# Patient Record
Sex: MALE | Race: WHITE
[De-identification: names, ages, dates, MRNs, and addresses within clinical notes are randomized per-mention and may not be internally consistent; named-entity substitution may affect disease eponyms.]

---

## 2017-03-21 ENCOUNTER — HOSPITAL ENCOUNTER (INPATIENT)
Dept: HOSPITAL 45 - C.EDB | Age: 82
LOS: 2 days | Discharge: HOME | DRG: 392 | End: 2017-03-23
Attending: HOSPITALIST | Admitting: HOSPITALIST
Payer: COMMERCIAL

## 2017-03-21 VITALS
WEIGHT: 155.43 LBS | HEIGHT: 67 IN | HEIGHT: 67 IN | BODY MASS INDEX: 24.39 KG/M2 | BODY MASS INDEX: 24.39 KG/M2 | WEIGHT: 155.43 LBS

## 2017-03-21 DIAGNOSIS — Z87.891: ICD-10-CM

## 2017-03-21 DIAGNOSIS — I08.0: ICD-10-CM

## 2017-03-21 DIAGNOSIS — Z95.0: ICD-10-CM

## 2017-03-21 DIAGNOSIS — R10.13: Primary | ICD-10-CM

## 2017-03-21 DIAGNOSIS — Z79.899: ICD-10-CM

## 2017-03-21 DIAGNOSIS — E78.00: ICD-10-CM

## 2017-03-21 DIAGNOSIS — I48.0: ICD-10-CM

## 2017-03-21 DIAGNOSIS — K29.70: ICD-10-CM

## 2017-03-21 DIAGNOSIS — Z95.1: ICD-10-CM

## 2017-03-21 DIAGNOSIS — I25.10: ICD-10-CM

## 2017-03-21 DIAGNOSIS — Z79.01: ICD-10-CM

## 2017-03-21 DIAGNOSIS — K21.9: ICD-10-CM

## 2017-03-21 DIAGNOSIS — I10: ICD-10-CM

## 2017-03-21 DIAGNOSIS — N28.89: ICD-10-CM

## 2017-03-21 LAB
ALP SERPL-CCNC: 77 U/L (ref 45–117)
ALT SERPL-CCNC: 25 U/L (ref 12–78)
ANION GAP SERPL CALC-SCNC: 7 MMOL/L (ref 3–11)
APPEARANCE UR: CLEAR
AST SERPL-CCNC: 20 U/L (ref 15–37)
BASOPHILS # BLD: 0.03 K/UL (ref 0–0.2)
BASOPHILS NFR BLD: 0.5 %
BILIRUB UR-MCNC: (no result) MG/DL
BUN SERPL-MCNC: 15 MG/DL (ref 7–18)
BUN/CREAT SERPL: 11.3 (ref 10–20)
CALCIUM SERPL-MCNC: 9.5 MG/DL (ref 8.5–10.1)
CHLORIDE SERPL-SCNC: 104 MMOL/L (ref 98–107)
CO2 SERPL-SCNC: 30 MMOL/L (ref 21–32)
COLOR UR: YELLOW
COMPLETE: YES
CREAT CL PREDICTED SERPL C-G-VRATE: 38.9 ML/MIN
CREAT SERPL-MCNC: 1.3 MG/DL (ref 0.6–1.4)
EOSINOPHIL NFR BLD AUTO: 162 K/UL (ref 130–400)
GLUCOSE SERPL-MCNC: 88 MG/DL (ref 70–99)
HCT VFR BLD CALC: 42.6 % (ref 42–52)
IG%: 0 %
IMM GRANULOCYTES NFR BLD AUTO: 35.6 %
INR PPP: 1.1 (ref 0.9–1.1)
LYMPHOCYTES # BLD: 2.03 K/UL (ref 1.2–3.4)
MANUAL MICROSCOPIC REQUIRED?: NO
MCH RBC QN AUTO: 30.3 PG (ref 25–34)
MCHC RBC AUTO-ENTMCNC: 35 G/DL (ref 32–36)
MCV RBC AUTO: 86.8 FL (ref 80–100)
MONOCYTES NFR BLD: 7.5 %
NEUTROPHILS # BLD AUTO: 0.7 %
NEUTROPHILS NFR BLD AUTO: 55.7 %
NITRITE UR QL STRIP: (no result)
PH UR STRIP: 7.5 [PH] (ref 4.5–7.5)
PMV BLD AUTO: 9.4 FL (ref 7.4–10.4)
POTASSIUM SERPL-SCNC: 3.9 MMOL/L (ref 3.5–5.1)
PROTHROMBIN TIME: 12.1 SECONDS (ref 9–12)
RBC # BLD AUTO: 4.91 M/UL (ref 4.7–6.1)
REVIEW REQ?: NO
SODIUM SERPL-SCNC: 141 MMOL/L (ref 136–145)
SP GR UR STRIP: 1.03 (ref 1–1.03)
URINE EPITHELIAL CELL AUTO: (no result) /LPF (ref 0–5)
UROBILINOGEN UR-MCNC: (no result) MG/DL
WBC # BLD AUTO: 5.71 K/UL (ref 4.8–10.8)
ZZUR CULT IF INDIC CLEAN CATCH: NO

## 2017-03-21 NOTE — DIAGNOSTIC IMAGING REPORT
CHEST ONE VIEW PORTABLE



HISTORY:      Atypical chest pain.



COMPARISON: Chest 11/30/2012.



FINDINGS: Left-sided dual-chamber pacemaker. The heart remains top normal in

size. Tortuous thoracic aorta, unchanged. No focal lung consolidations to

suggest pneumonia. No evidence for pulmonary edema. No pleural effusions. No

pneumothorax.



IMPRESSION:

No significant change compared to the prior study. No acute process.







Electronically signed by:  Flip Casillas M.D.

3/21/2017 8:23 PM



Dictated Date/Time:  3/21/2017 8:22 PM

## 2017-03-21 NOTE — DIAGNOSTIC IMAGING REPORT
ABDOMEN AND PELVIS CT WITH IV CONTRAST



CT DOSE: 321.19 mGy.cm



HISTORY:      epigastric abdominal pain 



TECHNIQUE: Multiaxial CT images of the abdomen and pelvis were performed

following the use of intravenous contrast.



COMPARISON STUDY: Abdomen and pelvis CT 11/26/2012.



FINDINGS: The lung bases are clear. Mild elevation of the left hemidiaphragm,

unchanged. Pacemaker wires and poststernotomy changes are noted. Stable 5 cm

fat-containing lesion within the left lateral chest wall. This favors a lipoma.

Cholecystectomy. The liver, adrenal glands, spleen, and pancreas are

unremarkable. No hydronephrosis. There is a 2 cm heterogeneous enhancing mass

within the interpolar region of the left kidney. This is consistent with a renal

cell carcinoma. There is a 5.9 cm cyst within the right kidney. There are few

additional bilateral renal hypodense lesions. The largest in the lower pole the

left kidney measures 1 cm and also favors a cyst. Additional subcentimeter

hypodense lesions within the kidneys measure up to 7 mm are technically too

small to characterize. The bladder is unremarkable. The prostate gland remains

enlarged. Colonic diverticulosis. No bowel wall thickening or obstruction. The

appendix is not clearly identified.



IMPRESSION: 



1. No bowel wall thickening or obstruction.

2. Colonic diverticulosis.

3. A 2 cm enhancing mass within the left kidney. This is consistent with a renal

cell carcinoma. Follow-up urologic consultation is recommended on a nonemergent

basis. 







Electronically signed by:  Flip Casillas M.D.

3/21/2017 10:01 PM



Dictated Date/Time:  3/21/2017 9:50 PM

## 2017-03-22 VITALS
DIASTOLIC BLOOD PRESSURE: 100 MMHG | TEMPERATURE: 99.32 F | OXYGEN SATURATION: 96 % | HEART RATE: 72 BPM | SYSTOLIC BLOOD PRESSURE: 166 MMHG

## 2017-03-22 VITALS — DIASTOLIC BLOOD PRESSURE: 86 MMHG | HEART RATE: 62 BPM | SYSTOLIC BLOOD PRESSURE: 189 MMHG

## 2017-03-22 VITALS
TEMPERATURE: 98.6 F | SYSTOLIC BLOOD PRESSURE: 134 MMHG | DIASTOLIC BLOOD PRESSURE: 76 MMHG | HEART RATE: 78 BPM | OXYGEN SATURATION: 94 %

## 2017-03-22 VITALS
TEMPERATURE: 98.78 F | DIASTOLIC BLOOD PRESSURE: 72 MMHG | OXYGEN SATURATION: 93 % | HEART RATE: 69 BPM | SYSTOLIC BLOOD PRESSURE: 132 MMHG

## 2017-03-22 VITALS
TEMPERATURE: 97.88 F | HEART RATE: 63 BPM | SYSTOLIC BLOOD PRESSURE: 157 MMHG | OXYGEN SATURATION: 93 % | DIASTOLIC BLOOD PRESSURE: 81 MMHG

## 2017-03-22 VITALS — SYSTOLIC BLOOD PRESSURE: 95 MMHG | HEART RATE: 65 BPM | DIASTOLIC BLOOD PRESSURE: 70 MMHG

## 2017-03-22 VITALS
DIASTOLIC BLOOD PRESSURE: 64 MMHG | TEMPERATURE: 97.34 F | OXYGEN SATURATION: 94 % | SYSTOLIC BLOOD PRESSURE: 114 MMHG | HEART RATE: 58 BPM

## 2017-03-22 VITALS — SYSTOLIC BLOOD PRESSURE: 183 MMHG | DIASTOLIC BLOOD PRESSURE: 85 MMHG

## 2017-03-22 LAB
CHOLEST/HDLC SERPL: 2.6 {RATIO}
CKMB/CK RATIO: 1.4 (ref 0–3)
CKMB/CK RATIO: 1.6 (ref 0–3)
CKMB/CK RATIO: 1.7 (ref 0–3)
CKMB/CK RATIO: 1.8 (ref 0–3)
GLUCOSE UR QL: 45 MG/DL
KETONES UR QL STRIP: 45 MG/DL
NITRITE UR QL STRIP: 124 MG/DL (ref 0–150)
PH UR: 115 MG/DL (ref 0–200)
VERY LOW DENSITY LIPOPROT CALC: 25 MG/DL

## 2017-03-22 RX ADMIN — HYDROCHLOROTHIAZIDE SCH MG: 25 TABLET ORAL at 08:59

## 2017-03-22 RX ADMIN — SUCRALFATE SCH GM: 1 SUSPENSION ORAL at 17:17

## 2017-03-22 RX ADMIN — SUCRALFATE SCH GM: 1 SUSPENSION ORAL at 19:06

## 2017-03-22 RX ADMIN — APIXABAN SCH MG: 2.5 TABLET, FILM COATED ORAL at 08:59

## 2017-03-22 RX ADMIN — SUCRALFATE SCH GM: 1 SUSPENSION ORAL at 13:45

## 2017-03-22 RX ADMIN — PANTOPRAZOLE SODIUM SCH MLS/MIN: 40 INJECTION, POWDER, FOR SOLUTION INTRAVENOUS at 19:19

## 2017-03-22 RX ADMIN — Medication SCH MG: at 08:58

## 2017-03-22 RX ADMIN — CARVEDILOL SCH MG: 3.12 TABLET, FILM COATED ORAL at 19:08

## 2017-03-22 RX ADMIN — MORPHINE SULFATE PRN MG: 2 INJECTION, SOLUTION INTRAMUSCULAR; INTRAVENOUS at 12:39

## 2017-03-22 RX ADMIN — CARVEDILOL SCH MG: 3.12 TABLET, FILM COATED ORAL at 14:37

## 2017-03-22 RX ADMIN — LISINOPRIL SCH MG: 5 TABLET ORAL at 08:58

## 2017-03-22 RX ADMIN — MORPHINE SULFATE PRN MG: 2 INJECTION, SOLUTION INTRAMUSCULAR; INTRAVENOUS at 09:02

## 2017-03-22 RX ADMIN — APIXABAN SCH MG: 2.5 TABLET, FILM COATED ORAL at 19:06

## 2017-03-22 NOTE — UROLOGY CONSULTATION
History


General


Date of Service:


Mar 22, 2017.


Chief Complaint:  left renal mass


Primary Care Physician:


Anson Cueto M.D.


Pt seen a urologist before?:  No





History of Present Illness


84 yo male admitted for chest and epigastric pain. 


Currently in the ED awaiting bed placement. 


 consulted for incidental finding of 2.5cm enhancing left renal mass on CT 

scan. Interpolar.


The pt denies seeing a urologist in the past, and has no knowledge of having 

been told he has a renal mass in the past. 


Denies dysuria or hematuria. 


Primary concerned with his epigastric/upper abdominal pain which he reports has 

been ongoing for a while now. 


Denies n/v.





Imaging


Imaging:  CT, Ultrasound





Laboratory





Last 24 Hours








Test


  3/21/17


20:10 3/21/17


22:55 3/22/17


05:05


 


White Blood Count 5.71 K/uL   


 


Red Blood Count 4.91 M/uL   


 


Hemoglobin 14.9 g/dL   


 


Hematocrit 42.6 %   


 


Mean Corpuscular Volume 86.8 fL   


 


Mean Corpuscular Hemoglobin 30.3 pg   


 


Mean Corpuscular Hemoglobin


Concent 35.0 g/dl 


  


  


 


 


Platelet Count 162 K/uL   


 


Mean Platelet Volume 9.4 fL   


 


Neutrophils (%) (Auto) 55.7 %   


 


Lymphocytes (%) (Auto) 35.6 %   


 


Monocytes (%) (Auto) 7.5 %   


 


Eosinophils (%) (Auto) 0.7 %   


 


Basophils (%) (Auto) 0.5 %   


 


Neutrophils # (Auto) 3.18 K/uL   


 


Lymphocytes # (Auto) 2.03 K/uL   


 


Monocytes # (Auto) 0.43 K/uL   


 


Eosinophils # (Auto) 0.04 K/uL   


 


Basophils # (Auto) 0.03 K/uL   


 


RDW Standard Deviation 43.6 fL   


 


RDW Coefficient of Variation 13.7 %   


 


Immature Granulocyte % (Auto) 0.0 %   


 


Immature Granulocyte # (Auto) 0.00 K/uL   


 


Prothrombin Time 12.1 SECONDS   


 


Prothromb Time International


Ratio 1.1 


  


  


 


 


Sodium Level 141 mmol/L   


 


Potassium Level 3.9 mmol/L   


 


Chloride Level 104 mmol/L   


 


Carbon Dioxide Level 30 mmol/L   


 


Anion Gap 7.0 mmol/L   


 


Blood Urea Nitrogen 15 mg/dl   


 


Creatinine 1.30 mg/dl   


 


Est Creatinine Clear Calc


Drug Dose 38.9 ml/min 


  


  


 


 


Estimated GFR (


American) 57.7 


  


  


 


 


Estimated GFR (Non-


American 49.8 


  


  


 


 


BUN/Creatinine Ratio 11.3   


 


Random Glucose 88 mg/dl   


 


Calcium Level 9.5 mg/dl   


 


Total Bilirubin 1.1 mg/dl   


 


Direct Bilirubin 0.3 mg/dl   


 


Aspartate Amino Transf


(AST/SGOT) 20 U/L 


  


  


 


 


Alanine Aminotransferase


(ALT/SGPT) 25 U/L 


  


  


 


 


Alkaline Phosphatase 77 U/L   


 


Troponin I < 0.015 ng/ml   < 0.015 ng/ml 


 


Total Protein 7.5 gm/dl   


 


Albumin 4.0 gm/dl   


 


Lipase 180 U/L   


 


Urine Color  YELLOW  


 


Urine Appearance  CLEAR  


 


Urine pH  7.5  


 


Urine Specific Gravity  1.026  


 


Urine Protein  NEG  


 


Urine Glucose (UA)  NEG  


 


Urine Ketones  NEG  


 


Urine Occult Blood  NEG  


 


Urine Nitrite  NEG  


 


Urine Bilirubin  NEG  


 


Urine Urobilinogen  NEG  


 


Urine Leukocyte Esterase  NEG  


 


Urine WBC (Auto)  0 /hpf  


 


Urine RBC (Auto)  0-4 /hpf  


 


Urine Hyaline Casts (Auto)  0 /lpf  


 


Urine Epithelial Cells (Auto)  0-5 /lpf  


 


Urine Bacteria (Auto)  NEG  


 


Total Creatine Kinase   99 U/L 


 


Creatine Kinase MB   1.4 ng/ml 


 


Creatine Kinase MB Ratio   1.4 


 


Triglycerides Level   124 mg/dl 


 


Cholesterol Level   115 mg/dl 


 


HDL Cholesterol   45 mg/dl 


 


LDL Cholesterol, Calculated   45 mg/dl 


 


VLDL Cholesterol, Calculated   25 mg/dl 


 


Cholesterol/HDL Ratio   2.6 











Problem List


Medical Problems:


(1) Abdominal pain


Status: Acute  





(2) Renal carcinoma


Status: Acute  





(3) Substernal precordial chest pain


Status: Acute  











Past History


A Fib, coronary artery disease, GERD


Past Surgical History:  coronary bypass surgery





Family History


Non-contributory





Social History


Hx Tobacco Use In Past Year?:  No


Smoking:  other (former smoker)


Alcohol:  no current use


Drug use:  none


Marital status:  


Housing status:  lives with family


Occupation status:  retired





Immunizations


History of Influenza Vaccine:  Yes


Influenza Vaccine Date:  Oct 28, 2012


History of Tetanus Vaccine?:  Unknown


History of Pneumococcal:  Yes


Pneumococcal Date:  Nov 28, 2010


History of Hepatitis B Vaccine:  No





History of MDRO


No





Allergies


Coded Allergies:  


     Niacin (Verified  Allergy, Unknown, 3/21/17)





Medications


Home Medications:





Home Meds and Scripts








 Medications  Dose


 Route/Sig


 Max Daily Dose Days Date Category


 


 Vitamin C


  (Ascorbic Acid)


 1,000 Mg Tab  1,000 Unit


 PO DAILY


    3/21/17 Reported


 


 Zantac


  (Ranitidine HCl)


 150 Mg Tab  150 Mg


 PO BID PRN


    3/21/17 Reported


 


 Ocean Nasal Spray


  (Saline) 0.65 %


 Spr  


 UD


    3/21/17 Reported


 


 Mag-Ox (Magnesium


 Oxide) 400 Mg Tab  400 Mg


 PO DAILY


    3/21/17 Reported


 


 Glucosamine 1500


 Complex


  (Glucosamine-Chondroitin-Vit


 C-) 1 Cap Cap  1 Cap


 PO DAILY


    3/21/17 Reported


 


 Omega-3 (Fish


 Oil) 1 Ea Cap  1 Cap


 PO DAILY


    3/21/17 Reported


 


 Prevacid


  (Lansoprazole) 15


 Mg Capcr  15 Mg


 PO BID PRN


    3/21/17 Reported


 


 Eliquis


  (Apixaban) 5 Mg


 Tab  5 Mg


 PO BID


    3/21/17 Reported


 


 Econazole Nitrate


 1 % Cre  1 Appln


 TOP BID PRN


    3/21/17 Reported


 


 Vitamin D


  (Cholecalciferol)


 1,000 Unit Tab  1,000 Unit


 PO DAILY


    3/21/17 Reported


 


 Doxazosin


 Mesylate 4 Mg Tab  2 Mg


 PO DAILY


    3/21/17 Reported


 


 Lisinopril 5 Mg


 Tab  5 Mg


 PO DAILY


    3/21/17 Reported


 


 Hydrochlorothiazide


 25 Mg Tab  25 Mg


 PO DAILY


    3/21/17 Reported


 


 Lipitor


  (Atorvastatin) 40


 Mg Tab  40 Mg


 PO HS


    11/28/12 Reported


 


 Multivitamin


  (Multivitamins)


 Tab  1 Tablet


 PO DAILY


    7/8/08 Reported








Inpatient Medications:





 Current Inpatient Medications








 Medications


  (Trade)  Dose


 Ordered  Sig/Brisa


 Route  Start Time


 Stop Time Status Last Admin


Dose Admin


 


 Ioversol


  (Optiray 320)  125 ml  UD  PRN


 IV  3/21/17 20:15


 3/25/17 20:14   


 


 


 Atorvastatin


 Calcium


  (Lipitor Tab)  40 mg  HS


 PO  3/22/17 21:00


 4/21/17 20:59   


 


 


 Doxazosin Mesylate


  (Cardura Tab)  2 mg  HS


 PO  3/22/17 21:00


 4/21/17 20:59   


 


 


 Hydrochlorothiazide


  (Hydrochlorothiazide


 Tab)  25 mg  DAILY


 PO  3/22/17 09:00


 4/21/17 08:59   


 


 


 Lisinopril


  (Zestril Tab)  5 mg  DAILY


 PO  3/22/17 09:00


 4/21/17 08:59   


 


 


 Magnesium Oxide


  (Mag-Ox Tab)  400 mg  DAILY


 PO  3/22/17 09:00


 4/21/17 08:59   


 


 


 Apixaban


  (Eliquis Tab)  5 mg  BID


 PO  3/22/17 09:00


 4/21/17 08:59   


 


 


 Lansoprazole


  (Prevacid


 Solutab)  15 mg  BID  PRN


 PO  3/22/17 00:45


 4/21/17 00:44   


 


 


 Morphine Sulfate


  (MoRPHine


 SULFATE INJ)  2 mg  Q4H  PRN


 IV  3/22/17 01:45


 4/5/17 01:44   


 











Review of Systems


Review of Systems


Constitutional:  No chills, No fever


Eyes:  No double vision


Neurological:  No dizzy


Endocrine:  No excessive thirst


Gastrointestinal:  + abdominal pain (upper abdominal/epigastric), No nausea, No 

vomiting


Cardiovascular:  No chest pain


Respiratory:  No shortness of breath


Skin:  No rash


Musculoskeletal:  + arthritis


Male :  No blood in urine, No painful urination





Physical Exam


Vital Signs:





 Vital Signs Past 12 Hours








  Date Time  Temp Pulse Resp B/P Pulse Ox O2 Delivery O2 Flow Rate FiO2


 


3/22/17 08:06 37.4 73 20 166/100    


 


3/22/17 06:14  72 16 163/92 94 Room Air  


 


3/22/17 05:19  76      


 


3/22/17 05:03  69 16 157/91 95 Room Air  


 


3/22/17 03:39  67 20 140/80 94 Room Air  


 


3/22/17 01:44  66 16 157/84 99 Room Air  


 


3/22/17 01:29  64      


 


3/22/17 00:26  66 18 160/80 93 Room Air  


 


3/21/17 23:00  68 16 179/94 96 Room Air  


 


3/21/17 22:43  72 18 148/84 97 Room Air  


 


3/21/17 22:35  70 20 159/89 95 Room Air  


 


3/21/17 22:20  62 18 197/106 95 Room Air  


 


3/21/17 21:23  73 22 181/99 96 Room Air  


 


3/21/17 20:33  60      








Physical Exam:


General Appearance:  no apparent distress


Eyes:  bilateral eyes normal inspection


ENT:  hearing grossly normal


Neck:  no JVD


Respiratory/Chest:  no respiratory distress, no accessory muscle use


Cardiovascular:  no JVD


Extremities:  normal inspection


Neurologic/Psychiatric:  alert, normal mood/affect, oriented x 3


Skin:  normal color





Assessment & Plan


Assessment & Plan


84 yo pleasant male with incidental 2.5cm enhancing left renal lesion found on 

CT. 


I have recommended outpatient f/u in 2-3 weeks with either Dr. Kurtz or Dr. Carlson to discuss observation vs intervention. Not currently an urgent issue at 

this time, as his epigastic pain is his primary issue at this time. 


Thanks for the consult. Will continue to follow along with primary service. 


Agree with above . Have pt f/u as outpt with Dr. Carlson or Jerardo

## 2017-03-22 NOTE — DIAGNOSTIC IMAGING REPORT
ULTRASOUND KIDNEYS AND BLADDER



CLINICAL HISTORY: Left renal lesion.



COMPARISON STUDY: Abdominal CT dated 3/21/2017.



TECHNIQUE: Real-time, grayscale, and color flow sonography of the kidneys and

bladder is performed. Images are reviewed in the transverse and longitudinal

planes.



FINDINGS:



Kidneys: The kidneys are atrophic. The right kidney measures 10.8 x 5.0 x 6.3 cm

and the left kidney measures 11.0 x 6.1 x 7.7 cm.  There is no hydronephrosis.

No shadowing renal calculi are identified. A 5.7 cm cyst is noted arising from

the right kidney. There is an indeterminant 2.5 cm solid-appearing lesion in the

interpolar left kidney. This shows internal flow on color imaging. A 2.2 cm cyst

is noted in the left lower pole. No perinephric fluid is identified.



Bladder: The partially decompressed bladder is grossly normal in appearance.

Ureteral jets were not seen.





IMPRESSION: 



1. The kidneys are atrophic and without hydronephrosis.



2. There is a 2.5 cm solid-appearing lesion in the interpolar left kidney

worrisome for neoplasm. This was better characterized on the contrast-enhanced

CT and nonemergent urology consultation is again recommended.



3. The bladder was decompressed and grossly unremarkable.







Electronically signed by:  Regan Plunkett M.D.

3/22/2017 7:15 AM



Dictated Date/Time:  3/22/2017 7:13 AM

## 2017-03-22 NOTE — HOSPITALIST PROGRESS NOTE
Hospitalist Progress Note


Date of Service


Mar 22, 2017.


 (Angelica Jain PA-C)





Subjective


Pt evaluation today including:  conversation w/ patient, physical exam, lab 

review


Patient reports continued epigastric abdominal pain.  Denies any nausea.  No 

changes in bowel movements.  No fever or chills.  Denies diaphoresis.  No 

dizziness.





   Additional Comments:


6 system review negative. Please see pertinent positives in the history of 

present illness section.


 (Angelica Jain PA-C)





Objective


Vital Signs











  Date Time  Temp Pulse Resp B/P Pulse Ox O2 Delivery O2 Flow Rate FiO2


 


3/22/17 11:45 36.6 63 18 157/81 93   


 


3/22/17 08:20 37.4 72 20 166/100 96 Room Air  


 


3/22/17 08:06 37.4 73 20 166/100    


 


3/22/17 06:14  72 16 163/92 94 Room Air  


 


3/22/17 05:19  76      


 


3/22/17 05:03  69 16 157/91 95 Room Air  


 


3/22/17 03:39  67 20 140/80 94 Room Air  


 


3/22/17 01:44  66 16 157/84 99 Room Air  


 


3/22/17 01:29  64      


 


3/22/17 00:26  66 18 160/80 93 Room Air  


 


3/21/17 23:00  68 16 179/94 96 Room Air  


 


3/21/17 22:43  72 18 148/84 97 Room Air  


 


3/21/17 22:35  70 20 159/89 95 Room Air  


 


3/21/17 22:20  62 18 197/106 95 Room Air  


 


3/21/17 21:23  73 22 181/99 96 Room Air  


 


3/21/17 20:33  60      


 


3/21/17 20:10  61 18 209/100 98 Room Air  


 


3/21/17 18:40 36.7 64 18 210/107 98 Room Air  








 (Angelica Jain PA-C)





Physical Exam


General Appearance:  no apparent distress


Eyes:  EOMI


Neck:  no JVD


Respiratory/Chest:  lungs clear


Cardiovascular:  regular rate, rhythm, + systolic murmur


Abdomen:  soft, + pertinent finding (tenderness to palpation in the epigastric 

region without any guarding or rebound tenderness appreciated.)


Extremities:  non-tender, no pedal edema


Neurologic/Psychiatric:  no motor/sensory deficits, oriented x 3


Skin:  warm/dry


 (Angelica Jain PA-C)





Laboratory Results


3/21/17 20:10








Red Blood Count 4.91, Mean Corpuscular Volume 86.8, Mean Corpuscular Hemoglobin 

30.3, Mean Corpuscular Hemoglobin Concent 35.0, Mean Platelet Volume 9.4, 

Neutrophils (%) (Auto) 55.7, Lymphocytes (%) (Auto) 35.6, Monocytes (%) (Auto) 

7.5, Eosinophils (%) (Auto) 0.7, Basophils (%) (Auto) 0.5, Neutrophils # (Auto) 

3.18, Lymphocytes # (Auto) 2.03, Monocytes # (Auto) 0.43, Eosinophils # (Auto) 

0.04, Basophils # (Auto) 0.03





3/21/17 20:10

















Test


  3/21/17


20:10 3/21/17


22:55 3/22/17


05:05 3/22/17


11:15


 


White Blood Count


  5.71 K/uL


(4.8-10.8) 


  


  


 


 


Red Blood Count


  4.91 M/uL


(4.7-6.1) 


  


  


 


 


Hemoglobin


  14.9 g/dL


(14.0-18.0) 


  


  


 


 


Hematocrit 42.6 % (42-52)    


 


Mean Corpuscular Volume


  86.8 fL


() 


  


  


 


 


Mean Corpuscular Hemoglobin


  30.3 pg


(25-34) 


  


  


 


 


Mean Corpuscular Hemoglobin


Concent 35.0 g/dl


(32-36) 


  


  


 


 


Platelet Count


  162 K/uL


(130-400) 


  


  


 


 


Mean Platelet Volume


  9.4 fL


(7.4-10.4) 


  


  


 


 


Neutrophils (%) (Auto) 55.7 %    


 


Lymphocytes (%) (Auto) 35.6 %    


 


Monocytes (%) (Auto) 7.5 %    


 


Eosinophils (%) (Auto) 0.7 %    


 


Basophils (%) (Auto) 0.5 %    


 


Neutrophils # (Auto)


  3.18 K/uL


(1.4-6.5) 


  


  


 


 


Lymphocytes # (Auto)


  2.03 K/uL


(1.2-3.4) 


  


  


 


 


Monocytes # (Auto)


  0.43 K/uL


(0.11-0.59) 


  


  


 


 


Eosinophils # (Auto)


  0.04 K/uL


(0-0.5) 


  


  


 


 


Basophils # (Auto)


  0.03 K/uL


(0-0.2) 


  


  


 


 


RDW Standard Deviation


  43.6 fL


(36.4-46.3) 


  


  


 


 


RDW Coefficient of Variation


  13.7 %


(11.5-14.5) 


  


  


 


 


Immature Granulocyte % (Auto) 0.0 %    


 


Immature Granulocyte # (Auto)


  0.00 K/uL


(0.00-0.02) 


  


  


 


 


Prothrombin Time


  12.1 SECONDS


(9.0-12.0) 


  


  


 


 


Prothromb Time International


Ratio 1.1 (0.9-1.1) 


  


  


  


 


 


Anion Gap


  7.0 mmol/L


(3-11) 


  


  


 


 


Est Creatinine Clear Calc


Drug Dose 38.9 ml/min 


  


  


  


 


 


Estimated GFR (


American) 57.7 


  


  


  


 


 


Estimated GFR (Non-


American 49.8 


  


  


  


 


 


BUN/Creatinine Ratio 11.3 (10-20)    


 


Calcium Level


  9.5 mg/dl


(8.5-10.1) 


  


  


 


 


Total Bilirubin


  1.1 mg/dl


(0.2-1) 


  


  


 


 


Direct Bilirubin


  0.3 mg/dl


(0-0.2) 


  


  


 


 


Aspartate Amino Transf


(AST/SGOT) 20 U/L (15-37) 


  


  


  


 


 


Alanine Aminotransferase


(ALT/SGPT) 25 U/L (12-78) 


  


  


  


 


 


Alkaline Phosphatase


  77 U/L


() 


  


  


 


 


Total Protein


  7.5 gm/dl


(6.4-8.2) 


  


  


 


 


Albumin


  4.0 gm/dl


(3.4-5.0) 


  


  


 


 


Lipase


  180 U/L


() 


  


  


 


 


Urine Color  YELLOW   


 


Urine Appearance  CLEAR (CLEAR)   


 


Urine pH  7.5 (4.5-7.5)   


 


Urine Specific Gravity


  


  1.026


(1.000-1.030) 


  


 


 


Urine Protein  NEG (NEG)   


 


Urine Glucose (UA)  NEG (NEG)   


 


Urine Ketones  NEG (NEG)   


 


Urine Occult Blood  NEG (NEG)   


 


Urine Nitrite  NEG (NEG)   


 


Urine Bilirubin  NEG (NEG)   


 


Urine Urobilinogen  NEG (NEG)   


 


Urine Leukocyte Esterase  NEG (NEG)   


 


Urine WBC (Auto)  0 /hpf (0-5)   


 


Urine RBC (Auto)  0-4 /hpf (0-4)   


 


Urine Hyaline Casts (Auto)  0 /lpf (0-5)   


 


Urine Epithelial Cells (Auto)  0-5 /lpf (0-5)   


 


Urine Bacteria (Auto)  NEG (NEG)   


 


Triglycerides Level


  


  


  124 mg/dl


(0-150) 


 


 


Cholesterol Level


  


  


  115 mg/dl


(0-200) 


 


 


HDL Cholesterol   45 mg/dl  


 


LDL Cholesterol, Calculated   45 mg/dl  


 


VLDL Cholesterol, Calculated   25 mg/dl  


 


Cholesterol/HDL Ratio   2.6  


 


Total Creatine Kinase


  


  


  


  88 U/L


()


 


Creatine Kinase MB


  


  


  


  1.4 ng/ml


(0.5-3.6)


 


Creatine Kinase MB Ratio    1.6 (0-3.0) 


 


Troponin I


  


  


  


  0.016 ng/ml


(0-0.045)








Last 24 Hours








Test


  3/21/17


20:10 3/21/17


22:55 3/22/17


05:05 3/22/17


11:15


 


White Blood Count 5.71 K/uL    


 


Red Blood Count 4.91 M/uL    


 


Hemoglobin 14.9 g/dL    


 


Hematocrit 42.6 %    


 


Mean Corpuscular Volume 86.8 fL    


 


Mean Corpuscular Hemoglobin 30.3 pg    


 


Mean Corpuscular Hemoglobin


Concent 35.0 g/dl 


  


  


  


 


 


Platelet Count 162 K/uL    


 


Mean Platelet Volume 9.4 fL    


 


Neutrophils (%) (Auto) 55.7 %    


 


Lymphocytes (%) (Auto) 35.6 %    


 


Monocytes (%) (Auto) 7.5 %    


 


Eosinophils (%) (Auto) 0.7 %    


 


Basophils (%) (Auto) 0.5 %    


 


Neutrophils # (Auto) 3.18 K/uL    


 


Lymphocytes # (Auto) 2.03 K/uL    


 


Monocytes # (Auto) 0.43 K/uL    


 


Eosinophils # (Auto) 0.04 K/uL    


 


Basophils # (Auto) 0.03 K/uL    


 


RDW Standard Deviation 43.6 fL    


 


RDW Coefficient of Variation 13.7 %    


 


Immature Granulocyte % (Auto) 0.0 %    


 


Immature Granulocyte # (Auto) 0.00 K/uL    


 


Prothrombin Time 12.1 SECONDS    


 


Prothromb Time International


Ratio 1.1 


  


  


  


 


 


Sodium Level 141 mmol/L    


 


Potassium Level 3.9 mmol/L    


 


Chloride Level 104 mmol/L    


 


Carbon Dioxide Level 30 mmol/L    


 


Anion Gap 7.0 mmol/L    


 


Blood Urea Nitrogen 15 mg/dl    


 


Creatinine 1.30 mg/dl    


 


Est Creatinine Clear Calc


Drug Dose 38.9 ml/min 


  


  


  


 


 


Estimated GFR (


American) 57.7 


  


  


  


 


 


Estimated GFR (Non-


American 49.8 


  


  


  


 


 


BUN/Creatinine Ratio 11.3    


 


Random Glucose 88 mg/dl    


 


Calcium Level 9.5 mg/dl    


 


Total Bilirubin 1.1 mg/dl    


 


Direct Bilirubin 0.3 mg/dl    


 


Aspartate Amino Transf


(AST/SGOT) 20 U/L 


  


  


  


 


 


Alanine Aminotransferase


(ALT/SGPT) 25 U/L 


  


  


  


 


 


Alkaline Phosphatase 77 U/L    


 


Troponin I < 0.015 ng/ml   < 0.015 ng/ml  0.016 ng/ml 


 


Total Protein 7.5 gm/dl    


 


Albumin 4.0 gm/dl    


 


Lipase 180 U/L    


 


Urine Color  YELLOW   


 


Urine Appearance  CLEAR   


 


Urine pH  7.5   


 


Urine Specific Gravity  1.026   


 


Urine Protein  NEG   


 


Urine Glucose (UA)  NEG   


 


Urine Ketones  NEG   


 


Urine Occult Blood  NEG   


 


Urine Nitrite  NEG   


 


Urine Bilirubin  NEG   


 


Urine Urobilinogen  NEG   


 


Urine Leukocyte Esterase  NEG   


 


Urine WBC (Auto)  0 /hpf   


 


Urine RBC (Auto)  0-4 /hpf   


 


Urine Hyaline Casts (Auto)  0 /lpf   


 


Urine Epithelial Cells (Auto)  0-5 /lpf   


 


Urine Bacteria (Auto)  NEG   


 


Total Creatine Kinase   99 U/L  88 U/L 


 


Creatine Kinase MB   1.4 ng/ml  1.4 ng/ml 


 


Creatine Kinase MB Ratio   1.4  1.6 


 


Triglycerides Level   124 mg/dl  


 


Cholesterol Level   115 mg/dl  


 


HDL Cholesterol   45 mg/dl  


 


LDL Cholesterol, Calculated   45 mg/dl  


 


VLDL Cholesterol, Calculated   25 mg/dl  


 


Cholesterol/HDL Ratio   2.6  








 (Angelica Jain PA-C)





Assessment and Plan


85-year-old male presents to the emergency department complaining of lower chest

, upper abdominal/epigastric pain that started fairly abruptly yesterday.  

Reportedly some relief with nitroglycerin in the emergency department.





Epigastric/chest pain-sounds GI related


-2 sets of cardiac enzymes negative


-EKG without ischemic changes


-Cardiology consult appreciated-continue current medications.  No further 

recommendations or diagnostic testing needed


-Begin pantoprazole 40 mg IV BID as the patient does have a history of GERD and 

has been off of his antacids for approximately 1 year





Coronary artery disease status post CABG 4 in 1995


-Continue medical management with lisinopril 5 mg daily, Lipitor 40 mg daily


-?  Why the patient isn't on a beta blocker.  His heart rate has been on the 

low side, which may explain why.  We'll defer to cardiology





History of paroxysmal atrial fibrillation-currently in sinus rhythm


-Continue anticoagulation with Eliquis 5 mg BID





New left renal mass


-Seen by urology-recommend outpatient follow-up in 2-3 weeks





DVT prophylaxis


-Eliquis


-TEDS, SCDs





CODE STATUS


-LEVEL I FULL CODE


 (Angelica Jain PA-C)


PA Physician Supervision Note:


I interviewed and examined the patient. Discussed with Angelica Jain PAC and 

agree with findings and plan as documented in the note. Any exceptions or 

clarifications are listed here: None





Pt here with epigastric pain, started at rest, no relieved with gi cocktail, 

neg Cardiac workup, neg CT abdomen pelvis





vss labs stable





abd with reproducible epigastric pain





will consult GI medicine, add carafate and ppi


renal mass is concerning, will have urology follow up


Dr knowles does not feel is acs





Documented By:  Marlon Hinson


 (Marlon Hinson M.D.)

## 2017-03-22 NOTE — CARDIOLOGY CONSULTATION
Cardiology Consultation


Date of Consultation:


Mar 22, 2017.


Requesting Physician:


Dr. Dalton


Attending Physician:


Dr. Villeda


Reason for Consultation:


Epigastric pain


Pt evaluation today including:  conversation w/ patient, physical exam, chart 

review, lab review, review of studies, review of inpatient medication list, 

conversation w/ attending


History of Present Illness


Mr. Bullock is an 85-year-old male with a history of coronary artery disease 

status post CABG x4 in 1995, paroxysmal atrial fibrillation, carotid artery 

disease status post right carotid endarterectomy, sick sinus syndrome status 

post dual-chamber pacemaker implantation, hypertension, mild to moderate aortic 

regurgitation, and mitral regurgitation who presented to the ED yesterday with 

complaints of epigastric discomfort.





He reports that he was sitting in his car around 1:30 pm yesterday waiting for 

his wife who was shopping. He then developed sharp/stabbing pain in his 

epigastric region, which he has never had before. He denied any associated pain 

in his chest, nausea, vomiting, diaphoresis, or shortness of breath. He and his 

wife then ate at the [employer], and his symptoms continued. He went home and 

took Tums and lied down. His pain seemed to worsen with lying down. He then 

proceeded to the ED for further evaluation. His blood pressure was elevated at 

210/107 upon arrival. He was treated with Zofran, GI cocktail, and IV fluids. 

He was also given sublingual nitro. His pain mildly improved after the nitro, 

but continued. His blood pressure improved after nitro administration. He has 

subsequently been treated with IV morphine for pain. This morning, he reports 

that his pain continued throughout the night, and he currently notes the 

discomfort. He feels as though it is worse when he takes deep breaths. He 

denies orthopnea, edema, lightheadedness, syncope, palpitations, abnormal 

bleeding, or cerebrovascular symptoms.





Of note, he reports that his symptoms prior to his CABG surgery included right 

arm and chest pain with associated nausea and diaphoresis.





Review of Systems: As noted in HPI. All other 10 point ROS otherwise negative.





Family History


Noncontributory given his age and documented CAD in himself.





Social History


Smoking Status:  Former Smoker


History of Alcohol Use:  Yes (1 BEER A MONTH)





Review of Systems


Constitutional:  Alert, oriented, in no acute distress


HEENT: Head is atraumatic and normocephalic. EOMs intact. Sclera anicteric. 

Face is symmetric. No perioral cyanosis. Mucous membranes moist.


Neck:  Supple, no JVD, right carotid endarterectomy scar, no carotid bruits


Pulmonary:  Normal respiratory effort, clear to auscultation bilaterally


Cardiac:  Regular rate and rhythm, normal S1 and S2, no gallops, no rubs, 2/6 

systolic murmur heard at the right upper sternal border and left sternal border


Extremities:  No clubbing, cyanosis, or edema. Pulses 2+ and symmetric.  Right 

radial artery access site well healed healed without bleeding or hematoma


Abdomen:  Normal bowel sounds, soft, tender upon palpation throughout but 

acutely tender in his epigastric region


Skin:  Normal skin color, turgor, and pigmentation, no rash, no skin lesions 


Neurological:  Oriented to person, place, and time





Allergies


Coded Allergies:  


     Niacin (Verified  Allergy, Unknown, 3/21/17)





Medications





 Current Inpatient Medications








 Medications


  (Trade)  Dose


 Ordered  Sig/Brisa


 Route  Start Time


 Stop Time Status Last Admin


Dose Admin


 


 Ioversol


  (Optiray 320)  125 ml  UD  PRN


 IV  3/21/17 20:15


 3/25/17 20:14   


 


 


 Atorvastatin


 Calcium


  (Lipitor Tab)  40 mg  HS


 PO  3/22/17 21:00


 4/21/17 20:59   


 


 


 Doxazosin Mesylate


  (Cardura Tab)  2 mg  HS


 PO  3/22/17 21:00


 4/21/17 20:59   


 


 


 Hydrochlorothiazide


  (Hydrochlorothiazide


 Tab)  25 mg  DAILY


 PO  3/22/17 09:00


 4/21/17 08:59  3/22/17 08:59


25 MG


 


 Lisinopril


  (Zestril Tab)  5 mg  DAILY


 PO  3/22/17 09:00


 4/21/17 08:59  3/22/17 08:58


5 MG


 


 Magnesium Oxide


  (Mag-Ox Tab)  400 mg  DAILY


 PO  3/22/17 09:00


 4/21/17 08:59  3/22/17 08:58


400 MG


 


 Apixaban


  (Eliquis Tab)  5 mg  BID


 PO  3/22/17 09:00


 4/21/17 08:59  3/22/17 08:59


5 MG


 


 Lansoprazole


  (Prevacid


 Solutab)  15 mg  BID  PRN


 PO  3/22/17 00:45


 4/21/17 00:44   


 


 


 Morphine Sulfate


  (MoRPHine


 SULFATE INJ)  2 mg  Q4H  PRN


 IV  3/22/17 01:45


 4/5/17 01:44  3/22/17 09:02


2 MG











Physical Exam





 Vital Signs Past 12 Hours








  Date Time  Temp Pulse Resp B/P Pulse Ox O2 Delivery O2 Flow Rate FiO2


 


3/22/17 08:20 37.4 72 20 166/100 96 Room Air  


 


3/22/17 08:06 37.4 73 20 166/100    


 


3/22/17 06:14  72 16 163/92 94 Room Air  


 


3/22/17 05:19  76      


 


3/22/17 05:03  69 16 157/91 95 Room Air  


 


3/22/17 03:39  67 20 140/80 94 Room Air  


 


3/22/17 01:44  66 16 157/84 99 Room Air  


 


3/22/17 01:29  64      


 


3/22/17 00:26  66 18 160/80 93 Room Air  


 


3/21/17 23:00  68 16 179/94 96 Room Air  


 


3/21/17 22:43  72 18 148/84 97 Room Air  


 


3/21/17 22:35  70 20 159/89 95 Room Air  


 


3/21/17 22:20  62 18 197/106 95 Room Air  











Data


Laboratory Results:





Last 24 Hours








Test


  3/21/17


20:10 3/21/17


22:55 3/22/17


05:05


 


White Blood Count 5.71 K/uL   


 


Red Blood Count 4.91 M/uL   


 


Hemoglobin 14.9 g/dL   


 


Hematocrit 42.6 %   


 


Mean Corpuscular Volume 86.8 fL   


 


Mean Corpuscular Hemoglobin 30.3 pg   


 


Mean Corpuscular Hemoglobin


Concent 35.0 g/dl 


  


  


 


 


Platelet Count 162 K/uL   


 


Mean Platelet Volume 9.4 fL   


 


Neutrophils (%) (Auto) 55.7 %   


 


Lymphocytes (%) (Auto) 35.6 %   


 


Monocytes (%) (Auto) 7.5 %   


 


Eosinophils (%) (Auto) 0.7 %   


 


Basophils (%) (Auto) 0.5 %   


 


Neutrophils # (Auto) 3.18 K/uL   


 


Lymphocytes # (Auto) 2.03 K/uL   


 


Monocytes # (Auto) 0.43 K/uL   


 


Eosinophils # (Auto) 0.04 K/uL   


 


Basophils # (Auto) 0.03 K/uL   


 


RDW Standard Deviation 43.6 fL   


 


RDW Coefficient of Variation 13.7 %   


 


Immature Granulocyte % (Auto) 0.0 %   


 


Immature Granulocyte # (Auto) 0.00 K/uL   


 


Prothrombin Time 12.1 SECONDS   


 


Prothromb Time International


Ratio 1.1 


  


  


 


 


Sodium Level 141 mmol/L   


 


Potassium Level 3.9 mmol/L   


 


Chloride Level 104 mmol/L   


 


Carbon Dioxide Level 30 mmol/L   


 


Anion Gap 7.0 mmol/L   


 


Blood Urea Nitrogen 15 mg/dl   


 


Creatinine 1.30 mg/dl   


 


Est Creatinine Clear Calc


Drug Dose 38.9 ml/min 


  


  


 


 


Estimated GFR (


American) 57.7 


  


  


 


 


Estimated GFR (Non-


American 49.8 


  


  


 


 


BUN/Creatinine Ratio 11.3   


 


Random Glucose 88 mg/dl   


 


Calcium Level 9.5 mg/dl   


 


Total Bilirubin 1.1 mg/dl   


 


Direct Bilirubin 0.3 mg/dl   


 


Aspartate Amino Transf


(AST/SGOT) 20 U/L 


  


  


 


 


Alanine Aminotransferase


(ALT/SGPT) 25 U/L 


  


  


 


 


Alkaline Phosphatase 77 U/L   


 


Troponin I < 0.015 ng/ml   < 0.015 ng/ml 


 


Total Protein 7.5 gm/dl   


 


Albumin 4.0 gm/dl   


 


Lipase 180 U/L   


 


Urine Color  YELLOW  


 


Urine Appearance  CLEAR  


 


Urine pH  7.5  


 


Urine Specific Gravity  1.026  


 


Urine Protein  NEG  


 


Urine Glucose (UA)  NEG  


 


Urine Ketones  NEG  


 


Urine Occult Blood  NEG  


 


Urine Nitrite  NEG  


 


Urine Bilirubin  NEG  


 


Urine Urobilinogen  NEG  


 


Urine Leukocyte Esterase  NEG  


 


Urine WBC (Auto)  0 /hpf  


 


Urine RBC (Auto)  0-4 /hpf  


 


Urine Hyaline Casts (Auto)  0 /lpf  


 


Urine Epithelial Cells (Auto)  0-5 /lpf  


 


Urine Bacteria (Auto)  NEG  


 


Total Creatine Kinase   99 U/L 


 


Creatine Kinase MB   1.4 ng/ml 


 


Creatine Kinase MB Ratio   1.4 


 


Triglycerides Level   124 mg/dl 


 


Cholesterol Level   115 mg/dl 


 


HDL Cholesterol   45 mg/dl 


 


LDL Cholesterol, Calculated   45 mg/dl 


 


VLDL Cholesterol, Calculated   25 mg/dl 


 


Cholesterol/HDL Ratio   2.6 








CXR: No significant change compared to the prior study. No acute process.





EKG: Normal sinus rhythm at 66 bpm. Voltage criteria for LVH. No acute ST, T 

wave abnormality.





Assessment & Plan


Patient is an 85-year-old male with known CAD s/p CABG x4, carotid artery 

disease, paroxysmal atrial fibrillation, hx of pacemaker implantation, 

hypertension, and valvular heart disease who presented to the ED yesterday with 

complaints of epigastric discomfort. He reports that he has never had similar 

pain to this before. He notes that his prior anginal symptoms included right 

arm and chest pain. ECG upon arrival showed sinus rhythm with no ischemic 

changes. 2 sets of cardiac enzymes were negative. The patient's discomfort has 

continued overnight, and he is acutely tender upon palpation of his abdomen on 

examination today. Given this information, his discomfort does not appear to be 

cardiac in origin. It could potentially be gastrointestinal in nature, and 

further evaluation of other, non-cardiac, causes is recommended. No additional 

cardiovascular studies or intervention is recommended at this time. Continue 

outpatient cardiac medications as prescribed.





Thank you for allowing  us to see this patient in consultation.





Patient was discussed with Dr. Villeda, who will also be in to see the patient 

later today.








DR. VILLEDA ADDENDUM:


Patient seen and examined.  I agree with assessment and plan as outlined by PUJA Blevins. 





Low suspicion current pain represents ACS.  No recent symptoms to suggest 

progression of his stable ischemic heart disease.  





No need for additional cardiac testing  at this time.  





Blood pressure down after hydralazine.  Continue home regimen and carvedilol.  





OK to hold anticoagulation if further invasive testing needed.

## 2017-03-22 NOTE — EMERGENCY ROOM VISIT NOTE
History


Report prepared by Ricaibfederico:  Harley Samayoa


Under the Supervision of:  Dr. Ranulfo Joseph D.O.


First contact with patient:  19:39


Chief Complaint:  CHEST PAIN


Stated Complaint:  CHEST PAIN


Nursing Triage Summary:  


Pt having non-radiating epigastric pain starting approximately 3 hours ago. Pt 


rates the pain a 8 (0-10 scale). Pt denies any SOB/ diaphoresis. 











PMH: CABG (1995)





History of Present Illness


The patient is a 85 year old male who presents to the Emergency Room with 

complaints of constant upper abdominal pain beginning 5 hours ago. He has a 

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 He has had a pacemaker 

in place for three years. The patient is on Eliquis for A-fib. He has a history 

of GERD and was previously on Prilosec and Zantac. He notes that he recently 

stopped taking the medications for GERD three weeks ago. The patient states 

that the pain in his pain feels similar to his GERD, except it is located 

higher on his abdomen. He states that he has taken Tums for his pain, but 

nothing has improved his symptoms. He states that his pain is worsened with 

laying down and after eating today. Pt denies headache, change in vision, fevers

, pain radiation, shortness of breath, nausea, vomiting, diarrhea, pain with 

urination, and melena.  No exertional component.  No arm or jaw pain.





   Source of History:  patient


   Onset:  5 hours ago


   Position:  abdomen (upper)


   Timing:  constant


   Modifying Factors (Worsening):  eating, other (laying down)


   Modifying Factors (Relieving):  other (none)


   Associated Symptoms:  No SOB, No diarrhea, No fevers, No headache, No melena

, No nausea, No urinary symptoms, No vomiting


Note:


The patient denies any pain radiation.





Review of Systems


See HPI for pertinent positives & negatives. A total of 10 systems reviewed and 

were otherwise negative.





Past Medical & Surgical


Medical Problems:


(1) A-fib


(2) Chest pain


(3) GERD (gastroesophageal reflux disease)


Surgical Problems:


(1) History of quadruple bypass








Family History


No pertinent family history stated.





Social History


Smoking Status:  Former Smoker


Alcohol Use:  none


Drug Use:  none


Marital Status:  


Housing Status:  lives with family


Occupation Status:  retired





Current/Historical Medications


Scheduled


Apixaban (Eliquis), 5 MG PO BID


Ascorbic Acid (Vitamin C), 1,000 UNIT PO DAILY


Atorvastatin (Lipitor), 40 MG PO HS


Cholecalciferol (Vitamin D), 1,000 UNIT PO DAILY


Doxazosin Mesylate (Doxazosin Mesylate), 2 MG PO DAILY


Fish Oil (Omega-3), 1 CAP PO DAILY


Glucosamine-Chondroitin-Vit C- (Glucosamine 1500 Complex), 1 CAP PO DAILY


Hydrochlorothiazide (Hydrochlorothiazide), 25 MG PO DAILY


Lisinopril (Lisinopril), 5 MG PO DAILY


Magnesium Oxide (Mag-Ox), 400 MG PO DAILY


Multivitamin (Multivitamin), 1 TABLET PO DAILY


Saline ([place] Nasal [place]), UD





Scheduled PRN


Econazole Nitrate (Econazole Nitrate), 1 APPLN TOP BID PRN for PRN


Lansoprazole (Prevacid), 15 MG PO BID PRN for PRN


Ranitidine (Zantac), 150 MG PO BID PRN for PRN





Allergies


Coded Allergies:  


     Niacin (Verified  Allergy, Unknown, 3/21/17)





Physical Exam


Vital Signs











  Date Time  Temp Pulse Resp B/P Pulse Ox O2 Delivery O2 Flow Rate FiO2


 


3/21/17 23:00  68 16 179/94 96 Room Air  


 


3/21/17 22:43  72 18 148/84 97 Room Air  


 


3/21/17 22:35  70 20 159/89 95 Room Air  


 


3/21/17 22:20  62 18 197/106 95 Room Air  


 


3/21/17 21:23  73 22 181/99 96 Room Air  


 


3/21/17 20:33  60      


 


3/21/17 20:10  61 18 209/100 98 Room Air  


 


3/21/17 18:40 36.7 64 18 210/107 98 Room Air  











Physical Exam


GENERAL: Sitting up in bed, disheveled, no acute distress, nontoxic.


EYE EXAM: normal conjunctiva.


OROPHARYNX: no exudate, no erythema, lips, buccal mucosa, and tongue normal and 

mucous membranes are moist


NECK: supple, no nuchal rigidity, no adenopathy, non-tender


LUNGS: Clear to auscultation. Normal chest wall mechanics


HEART: no murmurs, S1 normal and S2 normal 


ABDOMEN: abdomen soft with tenderness to palpation of the epigastric. Normo-

active bowel sounds, no masses, no rebound or guarding. 


BACK: Back is symmetrical on inspection and there is no deformity, no midline 

tenderness, no CVA tenderness. 


SKIN: no rashes and no bruising 


UPPER EXTREMITIES: upper extremities are grossly normal. 


LOWER EXTREMITIES: No pitting edema. Calves are equal bilaterally. 


NEURO EXAM: Normal sensorium, cranial nerves II-XII grossly intact, normal 

speech,  no gross weakness of arms, no gross weakness of legs.





Medical Decision & Procedures


ER Provider


Diagnostic Interpretation:


Xray results per the radiologist and my interpretation. Other results have been 

interpreted by the radiologist and reviewed by me.





CHEST ONE VIEW PORTABLE





FINDINGS: Left-sided dual-chamber pacemaker. The heart remains top normal in


size. Tortuous thoracic aorta, unchanged. No focal lung consolidations to


suggest pneumonia. No evidence for pulmonary edema. No pleural effusions. No


pneumothorax.





IMPRESSION:


No significant change compared to the prior study. No acute process.





Electronically signed by:  Flip Casillas M.D.








ABDOMEN AND PELVIS CT WITH IV CONTRAST





FINDINGS: The lung bases are clear. Mild elevation of the left hemidiaphragm,


unchanged. Pacemaker wires and poststernotomy changes are noted. Stable 5 cm


fat-containing lesion within the left lateral chest wall. This favors a lipoma.


Cholecystectomy. The liver, adrenal glands, spleen, and pancreas are


unremarkable. No hydronephrosis. There is a 2 cm heterogeneous enhancing mass


within the interpolar region of the left kidney. This is consistent with a renal


cell carcinoma. There is a 5.9 cm cyst within the right kidney. There are few


additional bilateral renal hypodense lesions. The largest in the lower pole the


left kidney measures 1 cm and also favors a cyst. Additional subcentimeter


hypodense lesions within the kidneys measure up to 7 mm are technically too


small to characterize. The bladder is unremarkable. The prostate gland remains


enlarged. Colonic diverticulosis. No bowel wall thickening or obstruction. The


appendix is not clearly identified.





IMPRESSION: 





1. No bowel wall thickening or obstruction.


2. Colonic diverticulosis.


3. A 2 cm enhancing mass within the left kidney. This is consistent with a renal


cell carcinoma. Follow-up urologic consultation is recommended on a nonemergent


basis. 





Electronically signed by:  Flip Casillas M.D.





Laboratory Results


3/21/17 20:10








Red Blood Count 4.91, Mean Corpuscular Volume 86.8, Mean Corpuscular Hemoglobin 

30.3, Mean Corpuscular Hemoglobin Concent 35.0, Mean Platelet Volume 9.4, 

Neutrophils (%) (Auto) 55.7, Lymphocytes (%) (Auto) 35.6, Monocytes (%) (Auto) 

7.5, Eosinophils (%) (Auto) 0.7, Basophils (%) (Auto) 0.5, Neutrophils # (Auto) 

3.18, Lymphocytes # (Auto) 2.03, Monocytes # (Auto) 0.43, Eosinophils # (Auto) 

0.04, Basophils # (Auto) 0.03





3/21/17 20:10

















Test


  3/21/17


20:10 3/21/17


22:55


 


White Blood Count


  5.71 K/uL


(4.8-10.8) 


 


 


Red Blood Count


  4.91 M/uL


(4.7-6.1) 


 


 


Hemoglobin


  14.9 g/dL


(14.0-18.0) 


 


 


Hematocrit 42.6 % (42-52)  


 


Mean Corpuscular Volume


  86.8 fL


() 


 


 


Mean Corpuscular Hemoglobin


  30.3 pg


(25-34) 


 


 


Mean Corpuscular Hemoglobin


Concent 35.0 g/dl


(32-36) 


 


 


Platelet Count


  162 K/uL


(130-400) 


 


 


Mean Platelet Volume


  9.4 fL


(7.4-10.4) 


 


 


Neutrophils (%) (Auto) 55.7 %  


 


Lymphocytes (%) (Auto) 35.6 %  


 


Monocytes (%) (Auto) 7.5 %  


 


Eosinophils (%) (Auto) 0.7 %  


 


Basophils (%) (Auto) 0.5 %  


 


Neutrophils # (Auto)


  3.18 K/uL


(1.4-6.5) 


 


 


Lymphocytes # (Auto)


  2.03 K/uL


(1.2-3.4) 


 


 


Monocytes # (Auto)


  0.43 K/uL


(0.11-0.59) 


 


 


Eosinophils # (Auto)


  0.04 K/uL


(0-0.5) 


 


 


Basophils # (Auto)


  0.03 K/uL


(0-0.2) 


 


 


RDW Standard Deviation


  43.6 fL


(36.4-46.3) 


 


 


RDW Coefficient of Variation


  13.7 %


(11.5-14.5) 


 


 


Immature Granulocyte % (Auto) 0.0 %  


 


Immature Granulocyte # (Auto)


  0.00 K/uL


(0.00-0.02) 


 


 


Prothrombin Time


  12.1 SECONDS


(9.0-12.0) 


 


 


Prothromb Time International


Ratio 1.1 (0.9-1.1) 


  


 


 


Anion Gap


  7.0 mmol/L


(3-11) 


 


 


Est Creatinine Clear Calc


Drug Dose 38.9 ml/min 


  


 


 


Estimated GFR (


American) 57.7 


  


 


 


Estimated GFR (Non-


American 49.8 


  


 


 


BUN/Creatinine Ratio 11.3 (10-20)  


 


Calcium Level


  9.5 mg/dl


(8.5-10.1) 


 


 


Total Bilirubin


  1.1 mg/dl


(0.2-1) 


 


 


Direct Bilirubin


  0.3 mg/dl


(0-0.2) 


 


 


Aspartate Amino Transf


(AST/SGOT) 20 U/L (15-37) 


  


 


 


Alanine Aminotransferase


(ALT/SGPT) 25 U/L (12-78) 


  


 


 


Alkaline Phosphatase


  77 U/L


() 


 


 


Troponin I


  < 0.015 ng/ml


(0-0.045) 


 


 


Total Protein


  7.5 gm/dl


(6.4-8.2) 


 


 


Albumin


  4.0 gm/dl


(3.4-5.0) 


 


 


Lipase


  180 U/L


() 


 


 


Urine Color  YELLOW 


 


Urine Appearance  CLEAR (CLEAR) 


 


Urine pH  7.5 (4.5-7.5) 


 


Urine Specific Gravity


  


  1.026


(1.000-1.030)


 


Urine Protein  NEG (NEG) 


 


Urine Glucose (UA)  NEG (NEG) 


 


Urine Ketones  NEG (NEG) 


 


Urine Occult Blood  NEG (NEG) 


 


Urine Nitrite  NEG (NEG) 


 


Urine Bilirubin  NEG (NEG) 


 


Urine Urobilinogen  NEG (NEG) 


 


Urine Leukocyte Esterase  NEG (NEG) 


 


Urine WBC (Auto)  0 /hpf (0-5) 


 


Urine RBC (Auto)  0-4 /hpf (0-4) 


 


Urine Hyaline Casts (Auto)  0 /lpf (0-5) 


 


Urine Epithelial Cells (Auto)  0-5 /lpf (0-5) 


 


Urine Bacteria (Auto)  NEG (NEG) 





Laboratory results per my review.





Medications Administered











 Medications


  (Trade)  Dose


 Ordered  Sig/Brisa


 Route  Start Time


 Stop Time Status Last Admin


Dose Admin


 


 Sodium Chloride


  (Nss 500ml)  500 ml @ 


 999 mls/hr  Q31M STAT


 IV  3/21/17 19:56


 3/21/17 20:26 DC 3/21/17 19:56


999 MLS/HR


 


 Ondansetron HCl


  (Zofran Inj)  4 mg  NOW  STAT


 IV  3/21/17 19:56


 3/21/17 19:57 DC 3/21/17 20:22


4 MG


 


 Aspirin


  (Aspirin Chew)  324 mg  NOW  STAT


 PO  3/21/17 20:05


 3/21/17 20:06 DC 3/21/17 20:22


324 MG


 


 Lidocaine HCl


  (Viscous


 Lidocaine 2% Soln)  20 ml  STK-MED ONCE


 .ROUTE  3/21/17 20:14


 3/21/17 20:18 DC 3/21/17 20:22


20 ML


 


 Al Hydroxide/Mg


 Hydroxide


  (Maalox Susp)  30 ml  STK-MED ONCE


 .ROUTE  3/21/17 20:14


 3/21/17 20:18 DC 3/21/17 20:22


30 ML


 


 Nitroglycerin


  (Nitrostat Tab)  0.4 mg  Q5M  PRN


 SL  3/21/17 21:15


 3/22/17 01:18 DC 3/21/17 22:23


0.4 MG


 


 Morphine Sulfate


  (MoRPHine


 SULFATE INJ)  4 mg  NOW  STAT


 IV  3/21/17 22:44


 3/21/17 22:45 DC 3/21/17 22:59


4 MG











ECG


Indication:  abdominal pain


Rate (beats per minute):  66


Rhythm:  sinus rhythm


Findings:  no ectopy, other (Normal axis)





ED Course


ED COURSE: 


Vital signs were reviewed and showed hypertension.


The patients medical record was reviewed


The above diagnostic studies were performed and reviewed.


ED treatments and interventions as stated above. 





1945: The patient was evaluated in room C5. A complete history and physical 

examination was performed.





1956: Ordered Zofran Inj 4 mg IV, Sodium Chloride 500 ml @ 999 mls/hr IV, GI 

Cocktail 24 mL PO.





2005: Ordered Aspirin Chew 324 mg PO. 





2115: Ordered Nitrostat Tab 0.4 mg SL. 





2244: I reassessed the patient. His pain has nearly resolved with nitro. 

Ordered Morphine Sulfate 4 mg IV.





2248: Upon reevaluation, the patient is resting comfortably. I discussed my 

findings with the patient and he understands and agrees with the treatment 

plan.   


Based on the patients age, coexisting illnesses, exam and lab findings the 

decision to treat as an inpatient was made.


The patient remained stable while under my care.


The patient will be evaluated for further management.





Medical Decision


Differential diagnoses includes but is not limited to gastritis, peptic ulcer 

disease, GERD, gallbladder disease, pancreatitis, small bowel obstruction, 

acute coronary syndrome, pericarditis, ischemic bowel, irritable bowel disease, 

irritable bowel syndrome, appendicitis, diverticulitis, malignancy, hernia, 

urinary tract infection, torsion, perforation, trauma, infectious. 





Patient is an 85-year-old male who presents the ER for lower chest epigastric 

abdominal pain.  He notes that he has never had pain like this before.  He has 

a history of quadruple bypass.  CBC along with BMP, LFTs and troponin and 

lipase are unremarkable.  T bili and direct bili is slightly elevated 5.1.  INR 

was 1.1.  UA was unremarkable.  EKG showed no ischemic changes.  Chest x-ray 

was unremarkable.  CT of the abdomen and pelvis shows likely renal cell 

carcinoma but is otherwise unremarkable.  Patient was given GI cocktail without 

improvement of pain.  He was also given aspirin and nitroglycerin which did 

improve pain.  Gage he was given morphine.  His systolic blood pressures 

improved significantly following oral nitroglycerin.  Although I favor his 

symptoms are likely gastric in nature since the nitroglycerin improved his pain 

and he was significantly hypertensive with a history of bypass I felt it was 

reasonable to discuss this case with internal medicine.  They elected to 

observe him overnight.





Consults


Time Called:  2240


Consulting Physician:  Dr. Dalton -[facility] [place]


Returned Call:  [phone]


I reviewed the patient's case with Dr. Dalton.  She will evaluate the 

patient for further management





Impression





 Primary Impression:  


 Substernal precordial chest pain


 Additional Impressions:  


 Abdominal pain


 Renal carcinoma





Scribe Attestation


The scribe's documentation has been prepared under my direction and personally 

reviewed by me in its entirety. I confirm that the note above accurately 

reflects all work, treatment, procedures, and medical decision making performed 

by me.





Departure Information


Dispostion


Being Evaluated By Hospitalist





Referrals


ProAnson M.D. (PCP)





Patient Instructions


My [facility]





Problem Qualifiers








 Additional Impressions:  


 Abdominal pain


 Abdominal location:  epigastric  Qualified Codes:  R10.13 - Epigastric pain


 Renal carcinoma


 Laterality:  unspecified laterality  Qualified Codes:  C64.9 - Malignant 

neoplasm of unspecified kidney, except renal pelvis

## 2017-03-22 NOTE — HISTORY AND PHYSICAL
History & Physical


Date & Time of Service:


Mar 22, 2017 at 00:30


Chief Complaint:


Chest Pain


Primary Care Physician:


Anson Cueto M.D.


History of Present Illness


Source:  patient, spouse


Steve Bullock is an 84 yo M with CAD, s/p CABG x 4 vessels, who presents with 

chest discomfort since 1:30pm today. It came on suddenly, was sharp in character

, did not radiate, and was located at his xiphoid process. He thought it was 

related to indigestion so took his usual Protonix and lied down but did not 

have any relief over 4 hours, so decided to come in. He received nitroglycerin 

and his pain improved. His BP was elevated upon arrival. He did have the flu 

over the last two weeks so had been coughing recently. He denied any fevers or 

shortness of breath.





Past Medical/Surgical History


Medical Problems:


(1) A-fib


Status: Chronic  





(2) GERD (gastroesophageal reflux disease)


Status: Chronic  





Surgical Problems:


(1) History of quadruple bypass


Status: Resolved  








Family History


No pertinent FHx





Social History


Smoking Status:  Former Smoker


Drug Use:  none


Marital Status:  


Occupational Status:  retired





Immunizations


History of Influenza Vaccine:  Yes


Influenza Vaccine Date:  Oct 28, 2012


History of Tetanus Vaccine?:  Unknown


History of Pneumococcal:  Yes


Pneumococcal Date:  Nov 28, 2010


History of Hepatitis B Vaccine:  No





Multi-Drug Resistant Organisms


History of MDRO:  No





Allergies


Coded Allergies:  


     Niacin (Verified  Allergy, Unknown, 3/21/17)





Home Medications


Scheduled


Apixaban (Eliquis), 5 MG PO BID


Ascorbic Acid (Vitamin C), 1,000 UNIT PO DAILY


Atorvastatin (Lipitor), 40 MG PO HS


Cholecalciferol (Vitamin D), 1,000 UNIT PO DAILY


Doxazosin Mesylate (Doxazosin Mesylate), 2 MG PO DAILY


Fish Oil (Omega-3), 1 CAP PO DAILY


Glucosamine-Chondroitin-Vit C- (Glucosamine 1500 Complex), 1 CAP PO DAILY


Hydrochlorothiazide (Hydrochlorothiazide), 25 MG PO DAILY


Lisinopril (Lisinopril), 5 MG PO DAILY


Magnesium Oxide (Mag-Ox), 400 MG PO DAILY


Multivitamin (Multivitamin), 1 TABLET PO DAILY


Saline (Bear Nasal [place]), UD





Scheduled PRN


Econazole Nitrate (Econazole Nitrate), 1 APPLN TOP BID PRN for PRN


Lansoprazole (Prevacid), 15 MG PO BID PRN for PRN


Ranitidine (Zantac), 150 MG PO BID PRN for PRN





Review of Systems


See HPI for pertinent positives & negatives. A total of 10 systems reviewed and 

were otherwise negative.





Physical Exam


Vital Signs











  Date Time  Temp Pulse Resp B/P Pulse Ox O2 Delivery O2 Flow Rate FiO2


 


3/22/17 00:26  66 18 160/80 93 Room Air  


 


3/21/17 23:00  68 16 179/94 96 Room Air  


 


3/21/17 22:43  72 18 148/84 97 Room Air  


 


3/21/17 22:35  70 20 159/89 95 Room Air  


 


3/21/17 22:20  62 18 197/106 95 Room Air  


 


3/21/17 21:23  73 22 181/99 96 Room Air  


 


3/21/17 20:33  60      


 


3/21/17 20:10  61 18 209/100 98 Room Air  


 


3/21/17 18:40 36.7 64 18 210/107 98 Room Air  








General Appearance:  WD/WN, no apparent distress


Head:  normocephalic, atraumatic


Eyes:  normal inspection, PERRL


ENT:  hearing grossly normal


Neck:  supple, no JVD


Respiratory/Chest:  lungs clear, normal breath sounds, no respiratory distress


Cardiovascular:  regular rate, rhythm, no murmur, normal peripheral pulses


Abdomen/GI:  soft, + guarding (to epigastrium, nontender)


Extremities/Musculoskelatal:  no calf tenderness, no pedal edema


Neurologic/Psych:  alert, normal mood/affect, normal reflexes, oriented x 3


Skin:  no rash





Diagnostics


Laboratory Results





Results Past 24 Hours








Test


  3/21/17


20:10 3/21/17


22:55 Range/Units


 


 


White Blood Count 5.71  4.8-10.8  K/uL


 


Red Blood Count 4.91  4.7-6.1  M/uL


 


Hemoglobin 14.9  14.0-18.0  g/dL


 


Hematocrit 42.6  42-52  %


 


Mean Corpuscular Volume 86.8    fL


 


Mean Corpuscular Hemoglobin 30.3  25-34  pg


 


Mean Corpuscular Hemoglobin


Concent 35.0


  


  32-36  g/dl


 


 


Platelet Count 162  130-400  K/uL


 


Mean Platelet Volume 9.4  7.4-10.4  fL


 


Neutrophils (%) (Auto) 55.7   %


 


Lymphocytes (%) (Auto) 35.6   %


 


Monocytes (%) (Auto) 7.5   %


 


Eosinophils (%) (Auto) 0.7   %


 


Basophils (%) (Auto) 0.5   %


 


Neutrophils # (Auto) 3.18  1.4-6.5  K/uL


 


Lymphocytes # (Auto) 2.03  1.2-3.4  K/uL


 


Monocytes # (Auto) 0.43  0.11-0.59  K/uL


 


Eosinophils # (Auto) 0.04  0-0.5  K/uL


 


Basophils # (Auto) 0.03  0-0.2  K/uL


 


RDW Standard Deviation 43.6  36.4-46.3  fL


 


RDW Coefficient of Variation 13.7  11.5-14.5  %


 


Immature Granulocyte % (Auto) 0.0   %


 


Immature Granulocyte # (Auto) 0.00  0.00-0.02  K/uL


 


Prothrombin Time


  12.1


  


  9.0-12.0


SECONDS


 


Prothromb Time International


Ratio 1.1


  


  0.9-1.1  


 


 


Sodium Level 141  136-145  mmol/L


 


Potassium Level 3.9  3.5-5.1  mmol/L


 


Chloride Level 104    mmol/L


 


Carbon Dioxide Level 30  21-32  mmol/L


 


Anion Gap 7.0  3-11  mmol/L


 


Blood Urea Nitrogen 15  7-18  mg/dl


 


Creatinine


  1.30


  


  0.60-1.40


mg/dl


 


Est Creatinine Clear Calc


Drug Dose 38.9


  


   ml/min


 


 


Estimated GFR (


American) 57.7


  


   


 


 


Estimated GFR (Non-


American 49.8


  


   


 


 


BUN/Creatinine Ratio 11.3  10-20  


 


Random Glucose 88  70-99  mg/dl


 


Calcium Level 9.5  8.5-10.1  mg/dl


 


Total Bilirubin 1.1  0.2-1  mg/dl


 


Direct Bilirubin 0.3  0-0.2  mg/dl


 


Aspartate Amino Transf


(AST/SGOT) 20


  


  15-37  U/L


 


 


Alanine Aminotransferase


(ALT/SGPT) 25


  


  12-78  U/L


 


 


Alkaline Phosphatase 77    U/L


 


Troponin I < 0.015  0-0.045  ng/ml


 


Total Protein 7.5  6.4-8.2  gm/dl


 


Albumin 4.0  3.4-5.0  gm/dl


 


Lipase 180    U/L


 


Urine Color  YELLOW  


 


Urine Appearance  CLEAR CLEAR  


 


Urine pH  7.5 4.5-7.5  


 


Urine Specific Gravity  1.026 1.000-1.030  


 


Urine Protein  NEG NEG  


 


Urine Glucose (UA)  NEG NEG  


 


Urine Ketones  NEG NEG  


 


Urine Occult Blood  NEG NEG  


 


Urine Nitrite  NEG NEG  


 


Urine Bilirubin  NEG NEG  


 


Urine Urobilinogen  NEG NEG  


 


Urine Leukocyte Esterase  NEG NEG  


 


Urine WBC (Auto)  0 0-5  /hpf


 


Urine RBC (Auto)  0-4 0-4  /hpf


 


Urine Hyaline Casts (Auto)  0 0-5  /lpf


 


Urine Epithelial Cells (Auto)  0-5 0-5  /lpf


 


Urine Bacteria (Auto)  NEG NEG  











Diagnostic Radiology


CHEST ONE VIEW PORTABLE





HISTORY:      Atypical chest pain.





COMPARISON: Chest 11/30/2012.





FINDINGS: Left-sided dual-chamber pacemaker. The heart remains top normal in


size. Tortuous thoracic aorta, unchanged. No focal lung consolidations to


suggest pneumonia. No evidence for pulmonary edema. No pleural effusions. No


pneumothorax.





IMPRESSION:


No significant change compared to the prior study. No acute process.














ABDOMEN AND PELVIS CT WITH IV CONTRAST





CT DOSE: 321.19 mGy.cm





HISTORY:      epigastric abdominal pain 





TECHNIQUE: Multiaxial CT images of the abdomen and pelvis were performed


following the use of intravenous contrast.





COMPARISON STUDY: Abdomen and pelvis CT 11/26/2012.





FINDINGS: The lung bases are clear. Mild elevation of the left hemidiaphragm,


unchanged. Pacemaker wires and poststernotomy changes are noted. Stable 5 cm


fat-containing lesion within the left lateral chest wall. This favors a lipoma.


Cholecystectomy. The liver, adrenal glands, spleen, and pancreas are


unremarkable. No hydronephrosis. There is a 2 cm heterogeneous enhancing mass


within the interpolar region of the left kidney. This is consistent with a renal


cell carcinoma. There is a 5.9 cm cyst within the right kidney. There are few


additional bilateral renal hypodense lesions. The largest in the lower pole the


left kidney measures 1 cm and also favors a cyst. Additional subcentimeter


hypodense lesions within the kidneys measure up to 7 mm are technically too


small to characterize. The bladder is unremarkable. The prostate gland remains


enlarged. Colonic diverticulosis. No bowel wall thickening or obstruction. The


appendix is not clearly identified.





IMPRESSION: 





1. No bowel wall thickening or obstruction.


2. Colonic diverticulosis.


3. A 2 cm enhancing mass within the left kidney. This is consistent with a renal


cell carcinoma. Follow-up urologic consultation is recommended on a nonemergent


basis.


Normal EKG





Impression


Assessment and Plan


84 yo M with CAD who presents with chest pain and HTN, incidentally found to 

have renal mass.





Chest / Epigastric pain


- Trend cardiac enzymes


- EKG in AM


- Pt sees Dr Garcia, requesting to see him as well


- Telemetry monitoring





Hypertension


- Improved with nitro


- Continue to monitor


- Hydralazine 10mg IV q6h PRN SBP > 170





Atrial fibrillation


- Continue home rate controlling meds


- Continue anticoagulation with Eliquis





GERD


- Continue home medications





Renal mass


- Urology consult (Routine) for masses


- Renal US





Level of Care


Telemetry





Resuscitation Status


FULL RESUSCITATION





VTE Prophylaxis


VTE Risk Assessment Done? Y/N:  Yes


Risk Level:  Low





Resident Tracking


Resident Involvement:  Resident Care Provided


Care Provided:  [facility]





Assessment and Plan





Date of admission: 03/21/2017





Attending Addendum:





I have physically seen and examined this patient, have directed their medical 

care, have supervised the medical residents activities, and agree with the H&P 

as noted above, with the following changes: 





The patient is awake, well-developed and adequately nourished, alert and 

oriented 3, normocephalic and atraumatic, lying in bed and in no acute 

distress.


HEENT--PERRL, EOMI, mucous membranes  and oropharynx dry.


Neck--supple, no JVD or bruits, thyroid normal, trachea midline, no adenopathy.


Heart--normal S1 and S2, no extra beats, no murmurs, rubs or gallops.


Lungs--clear bilaterally with good air movement, no respiratory distress, no 

accessory muscle use.


Abdomen--normal bowel sounds and soft, nontender and nondistended, no hernias 

or masses,  no organomegaly.


Extremities--no cyanosis, clubbing or edema. There are good distal pulses b/l.


Dermatologic--normal skin turgor, normal color, warm and dry, no abnormal lymph 

nodes, no rash.


Neurologic--cranial nerves II through XII grossly intact, motor and sensory 

examination normal.


Rheumatologic--normal range of motion, nontender, muscles and joints.


Psychiatric--normal affect.





Assessment and Plan:





CAD/hypertension/status post gastric bypass/atrial fibrillation/Chest/

epigastric pain--the patient will be admitted to the telemetry unit, for serial 

cardiac enzymes, cardiac rhythm monitoring and a 2-D echocardiogram with 

Dopplers.  His cardiologist Dr. Garcia will be consulted.  Continue Eliquis 5 mg 

by mouth twice a day, HCTZ 25 mg by mouth daily, lisinopril 5 mg by mouth daily

, doxazosin 2 mg by mouth daily and mag oxide 400 mg by mouth daily.





Hypercholesterolemia--continue atorvastatin 40 mg by mouth at bedtime and Fish 

oil 1 capsule by mouth daily.





2 cm left renal mass suggestive of renal cell cancer--noted on CT of abdomen 

and pelvis.  We'll order a 3 phase renal CT however, patient has already had 

contrast tonight.  We'll therefore order a renal ultrasound and urine cytology.

  Consult urology.

## 2017-03-23 VITALS
OXYGEN SATURATION: 95 % | HEART RATE: 61 BPM | TEMPERATURE: 97.7 F | SYSTOLIC BLOOD PRESSURE: 147 MMHG | DIASTOLIC BLOOD PRESSURE: 83 MMHG

## 2017-03-23 VITALS
HEART RATE: 76 BPM | DIASTOLIC BLOOD PRESSURE: 71 MMHG | SYSTOLIC BLOOD PRESSURE: 121 MMHG | OXYGEN SATURATION: 93 % | TEMPERATURE: 98.24 F

## 2017-03-23 VITALS
OXYGEN SATURATION: 95 % | DIASTOLIC BLOOD PRESSURE: 83 MMHG | SYSTOLIC BLOOD PRESSURE: 147 MMHG | HEART RATE: 61 BPM | TEMPERATURE: 97.7 F

## 2017-03-23 VITALS
DIASTOLIC BLOOD PRESSURE: 75 MMHG | TEMPERATURE: 98.06 F | HEART RATE: 65 BPM | SYSTOLIC BLOOD PRESSURE: 132 MMHG | OXYGEN SATURATION: 92 %

## 2017-03-23 RX ADMIN — HYDROCHLOROTHIAZIDE SCH MG: 25 TABLET ORAL at 08:42

## 2017-03-23 RX ADMIN — Medication SCH MG: at 08:19

## 2017-03-23 RX ADMIN — APIXABAN SCH MG: 2.5 TABLET, FILM COATED ORAL at 08:18

## 2017-03-23 RX ADMIN — PANTOPRAZOLE SODIUM SCH MLS/MIN: 40 INJECTION, POWDER, FOR SOLUTION INTRAVENOUS at 08:18

## 2017-03-23 RX ADMIN — CARVEDILOL SCH MG: 3.12 TABLET, FILM COATED ORAL at 08:18

## 2017-03-23 RX ADMIN — SUCRALFATE SCH GM: 1 SUSPENSION ORAL at 12:21

## 2017-03-23 RX ADMIN — SUCRALFATE SCH GM: 1 SUSPENSION ORAL at 08:18

## 2017-03-23 RX ADMIN — LISINOPRIL SCH MG: 5 TABLET ORAL at 08:19

## 2017-03-23 NOTE — DISCHARGE INSTRUCTIONS
Discharge Instructions


Date of Service


Mar 23, 2017.





Admission


Reason for Admission:  Chest Pain





Discharge


Discharge Diagnosis / Problem:  epigastric pain-possible gastritis





Discharge Goals


Goal(s):  Improve function, Diagnostic testing, Therapeutic intervention





Activity Recommendations


Activity Limitations:  resume your previous activity





.





Instructions / Follow-Up


Instructions / Follow-Up


You have been treated in the hospital for lower chest pain and epigastric pain. 

An EKG and cardiac workup were performed including cardiac enzymes.  All of 

these were normal.





You were treated with antacid medications and improved.  This likely means that 

your pain is stemming from a gastrointestinal cause such as gastritis or acid 

reflux





Your blood pressure was also found to be elevated.  A new medication was 

started for this.





The following changes/additions have been made to your medication list:


-Pantoprazole 40 mg by mouth twice daily for 2 weeks


-Carafate 1 g by mouth every 6 hours for 2 weeks PLEASE TAKE 1 HOUR BEFORE OR 2 

HOURS AFTER OTHER MEDS


-Coreg 3.125 mg by mouth twice daily


-Please discontinue Prevacid for now


-Continue Ranidine as prescribed











Please follow a bland diet.  No spicy or sitting foods.  Avoid alcohol, 

caffeine and nicotine.  Do not lie down for at least 30 minutes after eating


Please avoid certain over the counter pain medication called NSAIDS.  These are 

medications like ibuprofen, advil, motrin, aspirin, aleve, naproxen and naprosyn





An incidental finding was noted on a kidney ultrasound.  It appears that there 

is a 2 cm mass on the left kidney.  For this, follow-up with urology as 

recommended





Please follow-up with your primary care physician in one week


Please follow up with your GI doctor and urologist as scheduled 


Please also follow up with your cardiologist within 1 month for a blood 

pressure recheck





Call your doctor or return to the emergency department if you have any of the 

following symptoms:


-Fever of 101F or greater


-Persistent vomiting


- Persistent diarrhea


-Lethargy


-Worsening Chest or abdominal pain


-Shortness of breath


-severe dizziness


-weakness on one side of your body





Current Hospital Diet


Patient's current hospital diet: Full Liquid Diet





Discharge Diet


Recommended Diet:  AHA Diet (Heart Healthy) (bland diet)





Pending Studies


Studies pending at discharge:  no





Laboratory Results











Test


  3/22/17


23:10


 


Total Creatine Kinase


  103 U/L


()


 


Creatine Kinase MB


  1.7 ng/ml


(0.5-3.6)


 


Creatine Kinase MB Ratio 1.7 (0-3.0) 


 


Troponin I


  0.019 ng/ml


(0-0.045)














Test


  3/22/17


23:10


 


Total Creatine Kinase


  103 U/L


()


 


Creatine Kinase MB


  1.7 ng/ml


(0.5-3.6)


 


Creatine Kinase MB Ratio 1.7 (0-3.0) 


 


Troponin I


  0.019 ng/ml


(0-0.045)








 Lipid Panel








Test


  3/22/17


05:05 Range/Units


 


 


Triglycerides Level 124  0-150  mg/dl


 


Cholesterol Level 115  0-200  mg/dl


 


HDL Cholesterol 45   mg/dl


 


Cholesterol/HDL Ratio 2.6   


 


LDL Cholesterol, Calculated 45   mg/dl











Medical Emergencies








.


Who to Call and When:





Medical Emergencies:  If at any time you feel your situation is an emergency, 

please call 911 immediately.





.





Non-Emergent Contact


Non-Emergency issues call your:  Primary Care Provider





.


.





"Provider Documentation" section prepared by Angelica Jain.





VTE Core Measure


Inpt VTE Proph given/why not?:  Other Anticoagulation, T.E.D. Stockings, SCD's

## 2017-03-23 NOTE — GASTROINTESTINAL CONSULTATION
Gastrointestinal Consultation


History of Present Illness


Patient is a 85 year old male





Past Medical/Surgical History


Medical Problems:


(1) Abdominal pain


Status: Acute  





(2) Renal carcinoma


Status: Acute  





(3) Substernal precordial chest pain


Status: Acute  











Social History


Smoking Status:  Former Smoker


Alcohol Use:  none


Drug Use:  none


Marital Status:  


Housing Status:  lives with family


Occupation Status:  retired





Allergies


Coded Allergies:  


     Niacin (Verified  Allergy, Unknown, 3/21/17)





Current Medications





Home Meds and Scripts








 Medications  Dose


 Route/Sig


 Max Daily Dose Days Date Category


 


 Sucralfate 1


 Gm/10 Ml Susp  1 Gm


 PO QID


   14 3/23/17 Rx


 


 Protonix


  (Pantoprazole


 Sodium) 40 Mg Tab  40 Mg


 PO BID


   14 3/23/17 Rx


 


 Docusate Sodium


 100 Mg Cap  100 Mg


 PO BID PRN


   30 3/23/17 Rx


 


 Carvedilol 3.125


 Mg Tab  3.125 Mg


 PO BID


   30 3/23/17 Rx


 


 Vitamin C


  (Ascorbic Acid)


 1,000 Mg Tab  1,000 Unit


 PO DAILY


    3/21/17 Reported


 


 Zantac


  (Ranitidine HCl)


 150 Mg Tab  150 Mg


 PO BID PRN


    3/21/17 Reported


 


 Ocean Nasal Spray


  (Saline) 0.65 %


 Spr  


 UD


    3/21/17 Reported


 


 Mag-Ox (Magnesium


 Oxide) 400 Mg Tab  400 Mg


 PO DAILY


    3/21/17 Reported


 


 Glucosamine 1500


 Complex


  (Glucosamine-Chondroitin-Vit


 C-) 1 Cap Cap  1 Cap


 PO DAILY


    3/21/17 Reported


 


 Omega-3 (Fish


 Oil) 1 Ea Cap  1 Cap


 PO DAILY


    3/21/17 Reported


 


 Eliquis


  (Apixaban) 5 Mg


 Tab  5 Mg


 PO BID


    3/21/17 Reported


 


 Econazole Nitrate


 1 % Cre  1 Appln


 TOP BID PRN


    3/21/17 Reported


 


 Vitamin D


  (Cholecalciferol)


 1,000 Unit Tab  1,000 Unit


 PO DAILY


    3/21/17 Reported


 


 Doxazosin


 Mesylate 4 Mg Tab  2 Mg


 PO DAILY


    3/21/17 Reported


 


 Lisinopril 5 Mg


 Tab  5 Mg


 PO DAILY


    3/21/17 Reported


 


 Hydrochlorothiazide


 25 Mg Tab  25 Mg


 PO DAILY


    3/21/17 Reported


 


 Lipitor


  (Atorvastatin) 40


 Mg Tab  40 Mg


 PO HS


    11/28/12 Reported


 


 Multivitamin


  (Multivitamins)


 Tab  1 Tablet


 PO DAILY


    7/8/08 Reported











Physical Exam











  Date Time  Temp Pulse Resp B/P Pulse Ox O2 Delivery O2 Flow Rate FiO2


 


3/23/17 12:32      Room Air  


 


3/23/17 12:03 36.5 61 20  95 Room Air  


 


3/23/17 12:00      Room Air  


 


3/23/17 11:07 36.5 61 20 147/83 95   


 


3/23/17 11:00      Room Air  


 


3/23/17 08:00      Room Air  


 


3/23/17 07:54 36.7 65 18 132/75 92 Room Air  


 


3/23/17 04:42 36.8 76 18 121/71 93 Room Air  


 


3/23/17 04:00      Room Air  


 


3/23/17 00:00      Room Air  


 


3/22/17 23:03 37.0 78 18 134/76 94 Room Air  


 


3/22/17 20:00      Room Air  


 


3/22/17 19:51 37.1 69 18 132/72 93 Room Air  


 


3/22/17 17:56    183/85    


 


3/22/17 16:00      Room Air  


 


3/22/17 14:30 36.3 58 20 123/64 94   





  61  114/61    





  66  114/64    


 


3/22/17 14:22  65  95/70    


 


3/22/17 13:23  65  189/82    


 


3/22/17 13:23  62  176/86    











Laboratory Results





Last 24 Hours








Test


  3/22/17


17:20 3/22/17


23:10


 


Total Creatine Kinase 93 U/L  103 U/L 


 


Creatine Kinase MB 1.7 ng/ml  1.7 ng/ml 


 


Creatine Kinase MB Ratio 1.8  1.7 


 


Troponin I < 0.015 ng/ml  0.019 ng/ml 











Plan


Patient's GI consult was directed to [facility]. Received call 

from Kasi STEPHENS redirecting consult on 3/23/17 as patient currently 

sees Abimbola STEPHENS in the office. Patient was discharged prior to my 

evaluation. 





He has been instructed to follow-up with GI as an outpatient for further 

evaluation. 





Patient discharged prior to my evaluation

## 2017-03-23 NOTE — DISCHARGE SUMMARY
Discharge Summary


Date of Service


Mar 23, 2017.


 (Angelica Jain PA-C)





Discharge Summary


Admission Date:


Mar 21, 2017 at 23:14


Discharge Date:  Mar 23, 2017


Discharge Disposition:  Home


Principal Diagnosis:  epigastric/lower chest pain, gastritis


Problems/Secondary Diagnoses:


Coronary artery disease status post CABG 4


Paroxysmal A. fib


Carotid artery disease


Hypertension


Immunizations:  


   Have You Had Influenza Vaccine:  Yes


   Influenza Vaccine Date:  Oct 28, 2012


   History of Tetanus Vaccine?:  Unknown


   History of Pneumococcal:  Yes


   Pneumococcal Date:  Nov 28, 2010


   History of Hepatitis B Vaccine:  No


Procedures:


Renal ultrasound


1. The kidneys are atrophic and without hydronephrosis.





2. There is a 2.5 cm solid-appearing lesion in the interpolar left kidney


worrisome for neoplasm. This was better characterized on the contrast-enhanced


CT and nonemergent urology consultation is again recommended.





3. The bladder was decompressed and grossly unremarkable.


Consultations:


Urology


 (Angelica Jain PA-C)





Medication Reconciliation


New Medications:  


Pantoprazole (Protonix) 40 Mg Tab


40 MG PO BID for 14 Days, #28 TAB





Carvedilol (Carvedilol) 3.125 Mg Tab


3.125 MG PO BID for 30 Days, #60 TAB





Docusate Sodium (Docusate Sodium) 100 Mg Cap


100 MG PO BID PRN for Constipation for 30 Days, #60 CAP





Sucralfate (Sucralfate) 1 Gm/10 Ml Susp


1 GM PO QID for 14 Days, #56 DOSE





 


Continued Medications:  


Apixaban (Eliquis) 5 Mg Tab


5 MG PO BID, TAB





Ascorbic Acid (Vitamin C) 1,000 Mg Tab


1000 UNIT PO DAILY





Atorvastatin (Lipitor) 40 Mg Tab


40 MG PO HS, TAB





Cholecalciferol (Vitamin D) 1,000 Unit Tab


1000 UNIT PO DAILY





Doxazosin Mesylate (Doxazosin Mesylate) 4 Mg Tab


2 MG PO DAILY





Econazole Nitrate (Econazole Nitrate) 1 % Cre


1 APPLN TOP BID PRN for PRN





Fish Oil (Omega-3) 1 Ea Cap


1 CAP PO DAILY, CAP





Glucosamine-Chondroitin-Vit C- (Glucosamine 1500 Complex) 1 Cap Cap


1 CAP PO DAILY





Hydrochlorothiazide (Hydrochlorothiazide) 25 Mg Tab


25 MG PO DAILY





Lisinopril (Lisinopril) 5 Mg Tab


5 MG PO DAILY





Magnesium Oxide (Mag-Ox) 400 Mg Tab


400 MG PO DAILY, TAB





Multivitamin (Multivitamin)  Tab


1 TABLET PO DAILY, 0 Refills





Ranitidine (Zantac) 150 Mg Tab


150 MG PO BID PRN for PRN, TAB





Saline (Ocean Nasal Spray) 0.65 % Spr


UD





 


Discontinued Medications:  


Lansoprazole (Prevacid) 15 Mg Capcr


15 MG PO BID PRN for PRN, CAP











Referrals At Discharge


Follow up Referrals:  


Cardiologist Referral - Within a Month with Kameron Knowles MD


Gastroenterologist Referral - Within 1 Week with Marvin Le D.O.


Physician Referral - Within 1-2 Weeks with Anson Cueto M.D.


Urologist Referral - Within 2 Weeks with Kameron Kurtz M.D.








Discharge Exam


Pain is much improved today.  In fact, patient thinks that it is mostly gone.  

Denies any shortness of breath, heart palpitations or dizziness.  No 

diaphoresis.  No nausea.  Passing gas, but no bowel movements for 2 days.


Review of Systems:  


   Constitutional:  No fever


   Respiratory:  No shortness of breath


   Cardiovascular:  No chest pain


   Abdomen:  No nausea, No pain


   Genitourinary - Male:  No hematuria


Physical Exam:  


   General Appearance:  no apparent distress


   Eyes:  EOMI


   Neck:  no JVD


   Respiratory/Chest:  lungs clear


   Cardiovascular:  regular rate, rhythm (regular rhythm), + systolic murmur


   Abdomen / GI:  normal bowel sounds, non tender, soft


   Extremities:  no calf tenderness, no pedal edema


   Neurologic/Psychiatric:  no motor/sensory deficits, oriented x 3


   Skin:  warm/dry


 (Angelica Jain, PAEyalC)





Hospital Course


85-year-old male presents to the emergency department complaining of lower chest

, upper abdominal/epigastric pain that started fairly abruptly yesterday.  

Reportedly some relief with nitroglycerin in the emergency department.





Epigastric/chest likely secondary to GERD plus or minus gastritis versus small 

ulcer.  Patient has a history of GERD and has been off his medicines for 

approximately 1 year


-Monitored on telemetry


-2 sets of cardiac enzymes negative


-EKG without ischemic changes


-Cardiology consult appreciated-continue current medications.  No further 

recommendations or diagnostic testing needed


-Pantoprazole 40 mg IV BID--> 40 mg PO BID x 2 weeks.  Rx given


-Stop Prevacid for now.  Continue outpatient ranitidine


-Patient was given a follow-up appointment with his GI doctor, Dr. Le.  





Coronary artery disease status post CABG 4 in 1995


-Continue medical management with lisinopril 5 mg daily, Lipitor 40 mg daily


-?  Why the patient isn't on a beta blocker.  BP was on the high side..  Coreg 

3.125 mg PO BID started in house.  Given an Rx


-Follow up with cardiology in 1 month





History of paroxysmal atrial fibrillation-currently in sinus rhythm


-Continue anticoagulation with Eliquis 5 mg BID





New left renal mass


-Seen by urology-recommend outpatient follow-up in 2-3 weeks





DVT prophylaxis


-Eliquis


-TEDS, SCDs





CODE STATUS


-LEVEL I FULL CODE


Total Time Spent:  Greater than 30 minutes


This includes examination of the patient, discharge planning, medication 

reconciliation, and communication with other providers.


 (Angelica Jain PA-C)


PA Physician Supervision Note:


I interviewed and examined the patient. Discussed with Angelica Jain PAC and 

agree with findings and plan as documented in the note. Any exceptions or 

clarifications are listed here: None





Pt here with epigastric pain, started at rest,  neg Cardiac workup, neg CT 

abdomen pelvis, great improvement overnight with carafate and ppi





vss labs stable, no further pain on exam





contineu  carafate and ppi will follow up as outpt with Dr Le


renal mass is concerning, will have urology follow up


Dr knowles does not feel is acs





Documented By:  Marlon Hinson


 (Marlon Hinson M.D.)


Discharge Instructions


Please refer to the electronic Patient Visit Report (Discharge Instructions) 

for additional information.


 (Angelica Jain PA-C)





Follow-Up


Urology


GI


Cardiology


PCP


 (Angelica Jain PA-C)





Additional Copies To


Marvin Le D.O.; Kameron Knowles MD; Anson Cueto M.D.; Kameron Kurtz M.D.

## 2017-03-31 ENCOUNTER — HOSPITAL ENCOUNTER (OUTPATIENT)
Dept: HOSPITAL 45 - C.GI | Age: 82
Discharge: HOME | End: 2017-03-31
Attending: INTERNAL MEDICINE
Payer: COMMERCIAL

## 2017-03-31 VITALS — HEART RATE: 60 BPM | DIASTOLIC BLOOD PRESSURE: 78 MMHG | SYSTOLIC BLOOD PRESSURE: 166 MMHG | OXYGEN SATURATION: 98 %

## 2017-03-31 VITALS
WEIGHT: 160.34 LBS | BODY MASS INDEX: 25.77 KG/M2 | HEIGHT: 65.98 IN | WEIGHT: 160.34 LBS | BODY MASS INDEX: 25.77 KG/M2 | HEIGHT: 65.98 IN

## 2017-03-31 DIAGNOSIS — R13.10: Primary | ICD-10-CM

## 2017-03-31 DIAGNOSIS — Z87.891: ICD-10-CM

## 2017-03-31 DIAGNOSIS — Z88.8: ICD-10-CM

## 2017-03-31 DIAGNOSIS — K21.9: ICD-10-CM

## 2017-03-31 DIAGNOSIS — Z79.01: ICD-10-CM

## 2017-03-31 DIAGNOSIS — R07.89: ICD-10-CM

## 2017-03-31 DIAGNOSIS — Z98.890: ICD-10-CM

## 2017-03-31 NOTE — ENDO HISTORY AND PHYSICAL
History & Physical


Date of Service:


Mar 31, 2017.


Chief Complaint:


Atypical chest pain dysphagia


Referring Physician:


Abimbola STEPHENS


History of Present Illness


86 yo CM who presents for EGD secondary to Atypical chest pain, GERD, and 

Dysphagia.





Past Surgical History


Hx Cardiac Surgery:  Yes (CABG (QUAD), CAROTID ARTERY)


Hx Internal Defibrillator:  No


Hx Pacemaker:  Yes


Hx Abdominal Surgery:  Yes (CHOLECYSTECTOMY)


Hx of Implantable Prosthesis:  No


Hx Cancer Surgery:  No


Hx Thoracic Surgery:  No


Hx Orthopedic:  No


Hx Urinary Tract Surgery:  No





Social History


Smoking Status:  Former Smoker


Hx Substance Use:  No


Hx Alcohol Use:  Yes (1 BEER A MONTH)





Allergies


Coded Allergies:  


     Niacin (Verified  Allergy, Unknown, 3/21/17)





Current Medications





 Reported Home Medications








 Medications  Dose


 Route/Sig


 Max Daily Dose Days Date Category


 


 Sucralfate 1


 Gm/10 Ml Susp  1 Gm


 PO QID


   14 3/23/17 Rx


 


 Protonix


  (Pantoprazole


 Sodium) 40 Mg Tab  40 Mg


 PO BID


   14 3/23/17 Rx


 


 Docusate Sodium


 100 Mg Cap  100 Mg


 PO BID PRN


   30 3/23/17 Rx


 


 Carvedilol 3.125


 Mg Tab  3.125 Mg


 PO BID


   30 3/23/17 Rx


 


 Vitamin C


  (Ascorbic Acid)


 1,000 Mg Tab  1,000 Unit


 PO DAILY


    3/21/17 Reported


 


 Zantac


  (Ranitidine HCl)


 150 Mg Tab  150 Mg


 PO BID PRN


    3/21/17 Reported


 


 Ocean Nasal Spray


  (Saline) 0.65 %


 Spr  


 UD


    3/21/17 Reported


 


 Mag-Ox (Magnesium


 Oxide) 400 Mg Tab  400 Mg


 PO DAILY


    3/21/17 Reported


 


 Glucosamine 1500


 Complex


  (Glucosamine-Chondroitin-Vit


 C-) 1 Cap Cap  1 Cap


 PO DAILY


    3/21/17 Reported


 


 Omega-3 (Fish


 Oil) 1 Ea Cap  1 Cap


 PO DAILY


    3/21/17 Reported


 


 Eliquis


  (Apixaban) 5 Mg


 Tab  5 Mg


 PO BID


    3/21/17 Reported


 


 Econazole Nitrate


 1 % Cre  1 Appln


 TOP BID PRN


    3/21/17 Reported


 


 Vitamin D


  (Cholecalciferol)


 1,000 Unit Tab  1,000 Unit


 PO DAILY


    3/21/17 Reported


 


 Doxazosin


 Mesylate 4 Mg Tab  2 Mg


 PO DAILY


    3/21/17 Reported


 


 Lisinopril 5 Mg


 Tab  5 Mg


 PO DAILY


    3/21/17 Reported


 


 Hydrochlorothiazide


 25 Mg Tab  25 Mg


 PO DAILY


    3/21/17 Reported


 


 Lipitor


  (Atorvastatin) 40


 Mg Tab  40 Mg


 PO HS


    11/28/12 Reported


 


 Multivitamin


  (Multivitamins)


 Tab  1 Tablet


 PO DAILY


    7/8/08 Reported











Vital Signs


Weight (Kilograms):  72.73


Height (Feet):  5


Height (Inches):  6











  Date Time  Temp Pulse Resp B/P Pulse Ox O2 Delivery O2 Flow Rate FiO2


 


3/31/17 12:10 37 74 18 174/100 98 Room Air  





    186/90    











Physical Exam


General Appearance:  WD/WN, no apparent distress


Respiratory/Chest:  


   Auscultation:  breath sounds normal


Cardiovascular:  


   Heart Auscultation:  RRR


Abdomen:  


   Bowel Sounds:  normal


   Inspection & Palpation:  soft, non-distended, no tenderness, guarding & 

rebound





Assessment and Plan


Assessment:


86 yo CM who presents for EGD secondary to Atypical chest pain, GERD, and 

Dysphagia.








Plan:


Proceed with EGD.

## 2017-03-31 NOTE — DISCHARGE INSTRUCTIONS
Endoscopy Patient Instructions


Date / Procedure(s) Performed


Mar 31, 2017.


EGD





Allergy Information


Coded Allergies:  


     Niacin (Verified  Allergy, Unknown, 3/21/17)





Discharge Date / Findings


Mar 31, 2017.


Normal EGD





Medication Instructions


Stopped Medication(s):  


Eliquis Wed 3/29/17


OK to resume all medications today as prescribed


 Reported Home Medications








 Medications  Dose


 Route/Sig


 Max Daily Dose Days Date Category


 


 Sucralfate 1


 Gm/10 Ml Susp  1 Gm


 PO QID


   14 3/23/17 Rx


 


 Protonix


  (Pantoprazole


 Sodium) 40 Mg Tab  40 Mg


 PO BID


   14 3/23/17 Rx


 


 Docusate Sodium


 100 Mg Cap  100 Mg


 PO BID PRN


   30 3/23/17 Rx


 


 Carvedilol 3.125


 Mg Tab  3.125 Mg


 PO BID


   30 3/23/17 Rx


 


 Vitamin C


  (Ascorbic Acid)


 1,000 Mg Tab  1,000 Unit


 PO DAILY


    3/21/17 Reported


 


 Zantac


  (Ranitidine HCl)


 150 Mg Tab  150 Mg


 PO BID PRN


    3/21/17 Reported


 


 Ocean Nasal Spray


  (Saline) 0.65 %


 Spr  


 UD


    3/21/17 Reported


 


 Mag-Ox (Magnesium


 Oxide) 400 Mg Tab  400 Mg


 PO DAILY


    3/21/17 Reported


 


 Glucosamine 1500


 Complex


  (Glucosamine-Chondroitin-Vit


 C-) 1 Cap Cap  1 Cap


 PO DAILY


    3/21/17 Reported


 


 Omega-3 (Fish


 Oil) 1 Ea Cap  1 Cap


 PO DAILY


    3/21/17 Reported


 


 Eliquis


  (Apixaban) 5 Mg


 Tab  5 Mg


 PO BID


    3/21/17 Reported


 


 Econazole Nitrate


 1 % Cre  1 Appln


 TOP BID PRN


    3/21/17 Reported


 


 Vitamin D


  (Cholecalciferol)


 1,000 Unit Tab  1,000 Unit


 PO DAILY


    3/21/17 Reported


 


 Doxazosin


 Mesylate 4 Mg Tab  2 Mg


 PO DAILY


    3/21/17 Reported


 


 Lisinopril 5 Mg


 Tab  5 Mg


 PO DAILY


    3/21/17 Reported


 


 Hydrochlorothiazide


 25 Mg Tab  25 Mg


 PO DAILY


    3/21/17 Reported


 


 Lipitor


  (Atorvastatin) 40


 Mg Tab  40 Mg


 PO HS


    11/28/12 Reported


 


 Multivitamin


  (Multivitamins)


 Tab  1 Tablet


 PO DAILY


    7/8/08 Reported











Provider Instructions





Activity Restrictions





-  No exercising or heavy lifting for 24 hours. 


-  Do not drink alcohol the day of the procedure.


-  Do not drive a car or operate machinery until the day after the procedure.


-  Do not make any important decisions or sign important papers in 24 hours 

after the procedure.





Following Day:





-  Return to full activity which may include returning to work/school.





Diet





Start your diet with liquids and light foods (jello, soup, juice, toast).  Then 

eat your usual diet if not nauseated.





Treatment For Common After Affects





For mild abdominal pain, bloating, or excessive gas:





-  Rest


-  Eat lightly


-  Lie on right side





Follow-Up Information


Follow-up with Abimbola STEPHENS as scheduled





Anesthesia Information





What You Should Know





You have had a procedure that required some medicine to reduce anxiety and 

discomfort. This treatment is called moderate sedation.  


After receiving the treatment, you may be sleepy, but you will be able to 

breathe on your own.  The effects of the treatment may last for several hours.








Follow these instructions along with Activity/Diet recommendations noted above:





*  Do NOT do anything where dizziness or clumsiness would be dangerous.





*  Rest quietly at home today, then you can be up and about tomorrow.





*  Have a responsible person stay with you the rest of today.





*  You may have had an I.V. today.  If so, you may take the dressing off later 

today.





Recommendations


 


Call your doctor if:





*  Trouble breathing 





*  Continuous vomiting for more than 24 hours





*  Temperature above 101 degrees





*  Severe abdominal pain or bloating





*  Pain not relieved by pain medicine ordered





*  There is increased drainage or redness from any incision





*  A large amount of rectal bleeding greater than 2-3 tablespoons. 


   (If you had a polyp/s removed or have hemorrhoids, a small amount of blood -


    from the rectum is to be expected.)





*  You have any unanswered questions or concerns.





IN THE EVENT OF A SERIOUS EMERGENCY, GO TO THE NEAREST EMERGENCY ROOM





       Your discharge instructions were prepared by provider Marvin Le.


 Patient Instructions Signature Page








Steve Bullock 











Patient (or Guardian) Signature/Date:____________________________________ I 

have read and understand the instructions given to me by my caregivers.








Caregiver/RN/Doctor Signature/Date:____________________________________








The above-named patient and/or guardian has received patient instructions on 

this date.


























+  Original Patient Signature Page (only) stays with chart.  Please make copy 

for patient.

## 2017-03-31 NOTE — ANESTHESIOLOGY PROGRESS NOTE
Anesthesia Post Op Note


Date & Time


Mar 31, 2017 at 13:43





Vital Signs


Pain Intensity:  0





 Vital Signs Past 12 Hours








  Date Time  Temp Pulse Resp B/P Pulse Ox O2 Delivery O2 Flow Rate FiO2


 


3/31/17 13:21  62 18 158/81 98 Room Air  


 


3/31/17 13:06  63 18 138/71 98 Room Air  


 


3/31/17 12:10 37 74 18 174/100 98 Room Air  





    186/90    











Notes


Mental Status:  alert / awake / arousable, participated in evaluation


Pt Amnestic to Procedure:  Yes


Nausea / Vomiting:  adequately controlled


Pain:  adequately controlled


Airway Patency, RR, SpO2:  stable & adequate


BP & HR:  stable & adequate


Hydration State:  stable & adequate


Anesthetic Complications:  no major complications apparent


Pt did well.

## 2017-03-31 NOTE — GI REPORT
Procedure Date: 3/31/2017 12:45 PM

Procedure:            Upper GI endoscopy

Indications:          Dysphagia, Gastro-esophageal reflux disease, 

                      Unexplained chest pain

Medicines:            Monitored Anesthesia Care

Complications:        No immediate complications.

Estimated Blood Loss: Estimated blood loss: none.

Procedure:            Pre-Anesthesia Assessment:

                      - Prior to the procedure, a History and Physical was 

                      performed, and patient medications and allergies were 

                      reviewed. The patient's tolerance of previous 

                      anesthesia was also reviewed. The risks and benefits of 

                      the procedure and the sedation options and risks were 

                      discussed with the patient. All questions were 

                      answered, and informed consent was obtained. Prior 

                      Anticoagulants: The patient has taken Eliquis 

                      (apixaban), last dose was 2 days prior to procedure. 

                      ASA Grade Assessment: III - A patient with severe 

                      systemic disease. After reviewing the risks and 

                      benefits, the patient was deemed in satisfactory 

                      condition to undergo the procedure.

                      After obtaining informed consent, the endoscope was 

                      passed under direct vision. Throughout the procedure, 

                      the patient's blood pressure, pulse, and oxygen 

                      saturations were monitored continuously. The scope was 

                      introduced through the mouth, and advanced to the 

                      second part of duodenum. The upper GI endoscopy was 

                      accomplished without difficulty. The patient tolerated 

                      the procedure well.

Findings:

     The esophagus was normal.

     The stomach was normal.

     The examined duodenum was normal.

Impression:           - Normal esophagus.

                      - Normal stomach.

                      - Normal examined duodenum.

                      - No specimens collected.

Recommendation:       - Resume previous diet.

                      - Continue present medications.

                      - Perform routine esophageal manometry at appointment 

                      to be scheduled.

                      - Return to GI office as previously scheduled.

Marvin Le DO

3/31/2017 1:30:02 PM

This report has been signed electronically.

Note Initiated On: 3/31/2017 12:45 PM

     I attest to the content of the Intraoperative Record and orders 

     documented therein, exceptions below

## 2017-07-28 ENCOUNTER — HOSPITAL ENCOUNTER (OUTPATIENT)
Dept: HOSPITAL 45 - C.LABBFT | Age: 82
Discharge: HOME | End: 2017-07-28
Attending: INTERNAL MEDICINE
Payer: COMMERCIAL

## 2017-07-28 DIAGNOSIS — E78.5: Primary | ICD-10-CM

## 2017-07-28 DIAGNOSIS — R42: ICD-10-CM

## 2017-07-28 LAB
ALT SERPL-CCNC: 30 U/L (ref 12–78)
ANION GAP SERPL CALC-SCNC: 4 MMOL/L (ref 3–11)
AST SERPL-CCNC: 22 U/L (ref 15–37)
BASOPHILS # BLD: 0.02 K/UL (ref 0–0.2)
BASOPHILS NFR BLD: 0.3 %
BUN SERPL-MCNC: 17 MG/DL (ref 7–18)
BUN/CREAT SERPL: 15.2 (ref 10–20)
CALCIUM SERPL-MCNC: 9.9 MG/DL (ref 8.5–10.1)
CHLORIDE SERPL-SCNC: 104 MMOL/L (ref 98–107)
CHOLEST/HDLC SERPL: 2.8 {RATIO}
CO2 SERPL-SCNC: 31 MMOL/L (ref 21–32)
COMPLETE: YES
CREAT SERPL-MCNC: 1.1 MG/DL (ref 0.6–1.4)
EOSINOPHIL NFR BLD AUTO: 164 K/UL (ref 130–400)
GLUCOSE SERPL-MCNC: 86 MG/DL (ref 70–99)
GLUCOSE UR QL: 45 MG/DL
HCT VFR BLD CALC: 44 % (ref 42–52)
IG%: 0.2 %
IMM GRANULOCYTES NFR BLD AUTO: 37.7 %
KETONES UR QL STRIP: 55 MG/DL
LYMPHOCYTES # BLD: 2.17 K/UL (ref 1.2–3.4)
MCH RBC QN AUTO: 30.1 PG (ref 25–34)
MCHC RBC AUTO-ENTMCNC: 34.3 G/DL (ref 32–36)
MCV RBC AUTO: 87.6 FL (ref 80–100)
MONOCYTES NFR BLD: 7.8 %
NEUTROPHILS # BLD AUTO: 1.6 %
NEUTROPHILS NFR BLD AUTO: 52.4 %
NITRITE UR QL STRIP: 142 MG/DL (ref 0–150)
PH UR: 128 MG/DL (ref 0–200)
PMV BLD AUTO: 9.3 FL (ref 7.4–10.4)
POTASSIUM SERPL-SCNC: 4 MMOL/L (ref 3.5–5.1)
RBC # BLD AUTO: 5.02 M/UL (ref 4.7–6.1)
SODIUM SERPL-SCNC: 139 MMOL/L (ref 136–145)
VERY LOW DENSITY LIPOPROT CALC: 28 MG/DL
WBC # BLD AUTO: 5.76 K/UL (ref 4.8–10.8)

## 2017-09-18 ENCOUNTER — HOSPITAL ENCOUNTER (OUTPATIENT)
Dept: HOSPITAL 45 - C.LABBFT | Age: 82
Discharge: HOME | End: 2017-09-18
Attending: UROLOGY
Payer: COMMERCIAL

## 2017-09-18 DIAGNOSIS — I10: Primary | ICD-10-CM

## 2017-09-18 DIAGNOSIS — N28.89: ICD-10-CM

## 2017-09-18 LAB
ANION GAP SERPL CALC-SCNC: 5 MMOL/L (ref 3–11)
BUN SERPL-MCNC: 18 MG/DL (ref 7–18)
BUN/CREAT SERPL: 13.9 (ref 10–20)
CALCIUM SERPL-MCNC: 9.8 MG/DL (ref 8.5–10.1)
CHLORIDE SERPL-SCNC: 101 MMOL/L (ref 98–107)
CO2 SERPL-SCNC: 31 MMOL/L (ref 21–32)
CREAT SERPL-MCNC: 1.3 MG/DL (ref 0.6–1.4)
GLUCOSE SERPL-MCNC: 97 MG/DL (ref 70–99)
POTASSIUM SERPL-SCNC: 3.6 MMOL/L (ref 3.5–5.1)
SODIUM SERPL-SCNC: 137 MMOL/L (ref 136–145)

## 2017-10-03 ENCOUNTER — HOSPITAL ENCOUNTER (OUTPATIENT)
Dept: HOSPITAL 45 - C.CTS | Age: 82
Discharge: HOME | End: 2017-10-03
Attending: UROLOGY
Payer: COMMERCIAL

## 2017-10-03 DIAGNOSIS — N28.89: Primary | ICD-10-CM

## 2017-10-03 NOTE — DIAGNOSTIC IMAGING REPORT
KIDNEY (ABDOMEN) COMBO



CLINICAL HISTORY: 86 years-old Male presenting with follow-up left RENAL MASS. 



TECHNIQUE: Multidetector CT of the abdomen was performed before and after the

administration of intravenous contrast. IV contrast: 120 mL of Optiray 320. A

dose lowering technique was used consistent with the principles of ALARA (as low

as reasonably achievable). 



COMPARISON: 3/21/2017.



CT DOSE (mGy.cm): The estimated cumulative dose is 1149.66 mGycm.



FINDINGS:



 topogram: Cholecystectomy clips. Median sternotomy wires and 2-lead pacer.



Lung bases: Partially visualized pacer leads to the right arm and right

ventricular apex. Median sternotomy wires. Heart size normal. Aortic valve and

coronary artery calcification. Lung bases clear. No pericardial or pleural

effusion.



Liver: Normal morphology. No liver lesion. Patent hepatic vasculature.



Biliary: Mild prominence of intrahepatic and extra hepatic bile ducts. Is likely

due to a reservoir effect in the post cholecystectomy state. Gallbladder

surgically absent.



Pancreas: Moderate parenchymal atrophy.



Spleen: Normal.



Adrenal glands: Normal.



Kidneys and ureters: Multiple well-defined hypodensities consistent with simple

cysts. The previously noted solid lesion enhancing renal lesion in the lateral

aspect of the interpolar region of the left kidney now measures 2.1 cm,

previously 1.8 cm when remeasured at a comparable level. No soft tissue

extension into the perinephric fat. No involvement of the renal hilum. No

hydronephrosis. No renal calculi.



Bowel: Normal. No bowel obstruction.



Peritoneal cavity: No free fluid or intraperitoneal gas.



Vasculature: Atherosclerosis of the normal caliber abdominal aorta. IVC patent.

Calcified atherosclerotic plaque narrows the lumen of the superior mesenteric

artery approximately 3 to 4 cm beyond its origin (approximately 50% stenosis).



Lymph nodes: No enlarged lymph nodes in the region of the left renal hilum or

elsewhere in the upper abdomen.



Abdominal wall: Normal.



Musculoskeletal: Degenerative changes of the spine.



IMPRESSION:

1.  Stable to slight interval increase in size of the suspicious left renal

lesion, which is consistent with renal cell carcinoma (T1a). No lymphadenopathy

or evidence of metastatic disease in the abdomen.







Electronically signed by:  Vladislav Larsen M.D.

10/3/2017 9:51 AM



Dictated Date/Time:  10/3/2017 9:44 AM

## 2018-02-26 ENCOUNTER — HOSPITAL ENCOUNTER (OUTPATIENT)
Dept: HOSPITAL 45 - C.LABBFT | Age: 83
Discharge: HOME | End: 2018-02-26
Attending: INTERNAL MEDICINE
Payer: COMMERCIAL

## 2018-02-26 DIAGNOSIS — E78.5: Primary | ICD-10-CM

## 2018-02-26 DIAGNOSIS — E55.9: ICD-10-CM

## 2018-02-26 DIAGNOSIS — I10: ICD-10-CM

## 2018-02-26 DIAGNOSIS — I47.1: ICD-10-CM

## 2018-02-26 LAB
ALT SERPL-CCNC: 37 U/L (ref 12–78)
AST SERPL-CCNC: 26 U/L (ref 15–37)
BUN SERPL-MCNC: 19 MG/DL (ref 7–18)
CALCIUM SERPL-MCNC: 9.7 MG/DL (ref 8.5–10.1)
CO2 SERPL-SCNC: 29 MMOL/L (ref 21–32)
CREAT SERPL-MCNC: 1.17 MG/DL (ref 0.6–1.4)
EOSINOPHIL NFR BLD AUTO: 147 K/UL (ref 130–400)
GLUCOSE SERPL-MCNC: 96 MG/DL (ref 70–99)
HCT VFR BLD CALC: 42.8 % (ref 42–52)
HGB BLD-MCNC: 15.2 G/DL (ref 14–18)
KETONES UR QL STRIP: 52 MG/DL
MCH RBC QN AUTO: 31 PG (ref 25–34)
MCHC RBC AUTO-ENTMCNC: 35.5 G/DL (ref 32–36)
MCV RBC AUTO: 87.2 FL (ref 80–100)
PH UR: 118 MG/DL (ref 0–200)
PMV BLD AUTO: 9.3 FL (ref 7.4–10.4)
POTASSIUM SERPL-SCNC: 4.1 MMOL/L (ref 3.5–5.1)
RED CELL DISTRIBUTION WIDTH CV: 13.3 % (ref 11.5–14.5)
RED CELL DISTRIBUTION WIDTH SD: 42.5 FL (ref 36.4–46.3)
SODIUM SERPL-SCNC: 136 MMOL/L (ref 136–145)
WBC # BLD AUTO: 5.24 K/UL (ref 4.8–10.8)

## 2018-04-17 ENCOUNTER — HOSPITAL ENCOUNTER (OUTPATIENT)
Dept: HOSPITAL 45 - C.CTS | Age: 83
Discharge: HOME | End: 2018-04-17
Attending: UROLOGY
Payer: COMMERCIAL

## 2018-04-17 DIAGNOSIS — N28.89: Primary | ICD-10-CM

## 2018-04-17 NOTE — DIAGNOSTIC IMAGING REPORT
KIDNEY (ABDOMEN) COMBO



CLINICAL HISTORY: 86 years-old Male presenting with N28.89 Renal mass. 



TECHNIQUE: Multidetector CT of the abdomen was performed before and after the

administration of intravenous contrast. IV contrast: 95 mL of Optiray 320. A

dose lowering technique was used consistent with the principles of ALARA (as low

as reasonably achievable). 



COMPARISON: 10/3/2017.



CT DOSE (mGy.cm): The estimated cumulative dose is 1222.83 mGycm.



FINDINGS:



 topogram: Partially visualized pacer leads to the right atrium and right

ventricular apex. Median sternotomy wires and mediastinal surgical clips.

Cholecystectomy clips.



Lung bases: Lung bases clear. Partially visualized pacer leads. Coronary artery

calcification. Normal heart size. No pericardial or pleural effusion. 



Liver: Normal morphology. Normal density. No liver lesion. Patent hepatic

vasculature.



Biliary: Mild biliary ductal prominence likely a reservoir effect in the post

cholecystectomy state. Gallbladder surgically absent.



Pancreas: Moderate parenchymal atrophy.



Spleen: Normal.



Adrenal glands: Normal.



Kidneys and ureters: Multiple hypodensities in the kidneys consistent with

simple cysts, the largest measuring 6 cm on the right. Redemonstration of the

heterogeneously enhancing 1.8 cm mass arising from the lateral aspect of the

interpolar region of the left kidney (series 5 image 172), previously 2.1 cm.

This is unchanged in size and appearance. No evidence of nodular invasion of the

perinephric fat. Mild nonspecific bilateral perinephric fat stranding. No

hydronephrosis. Mild left urothelial thickening suggested. Proximal ureters

normal bilaterally. Renal vasculature are patent. Normal excretion of contrast

bilaterally. No filling defects within the renal collecting systems or proximal

ureters.



Bowel: Scattered colonic diverticula. No bowel obstruction.



Peritoneal cavity: No free fluid or intraperitoneal gas.



Lymph nodes: No enlarged lymph nodes in the abdomen.



Vasculature: Atherosclerosis of the normal caliber abdominal aorta.



Abdominal wall: Normal.



Musculoskeletal: Degenerative changes of the spine.



IMPRESSION:

1.  Stable appearance of the 1.8 cm left renal neoplasm consistent with renal

cell carcinoma. No evidence of perinephric fat invasion, vascular invasion,

lymphadenopathy, or metastatic disease in the abdomen. No significant change

since the prior exam.







Electronically signed by:  Vladislav Larsen M.D.

4/17/2018 9:04 AM



Dictated Date/Time:  4/17/2018 8:57 AM

## 2018-04-26 ENCOUNTER — HOSPITAL ENCOUNTER (OUTPATIENT)
Dept: HOSPITAL 45 - C.LABSPEC | Age: 83
Discharge: HOME | End: 2018-04-26
Attending: DERMATOLOGY
Payer: COMMERCIAL

## 2018-04-26 DIAGNOSIS — B35.1: ICD-10-CM

## 2018-04-26 DIAGNOSIS — L73.9: ICD-10-CM

## 2018-04-26 DIAGNOSIS — B35.4: Primary | ICD-10-CM
